# Patient Record
Sex: MALE | Race: WHITE | NOT HISPANIC OR LATINO | Employment: FULL TIME | URBAN - METROPOLITAN AREA
[De-identification: names, ages, dates, MRNs, and addresses within clinical notes are randomized per-mention and may not be internally consistent; named-entity substitution may affect disease eponyms.]

---

## 2020-06-04 ENCOUNTER — HOSPITAL ENCOUNTER (EMERGENCY)
Facility: HOSPITAL | Age: 20
Discharge: HOME | End: 2020-06-04
Attending: EMERGENCY MEDICINE | Admitting: EMERGENCY MEDICINE

## 2020-06-04 VITALS
WEIGHT: 210 LBS | RESPIRATION RATE: 16 BRPM | BODY MASS INDEX: 28.44 KG/M2 | DIASTOLIC BLOOD PRESSURE: 88 MMHG | HEART RATE: 92 BPM | OXYGEN SATURATION: 98 % | TEMPERATURE: 98.5 F | SYSTOLIC BLOOD PRESSURE: 154 MMHG | HEIGHT: 72 IN

## 2020-06-04 DIAGNOSIS — Z87.828 HISTORY OF TRAUMATIC HEAD INJURY: ICD-10-CM

## 2020-06-04 DIAGNOSIS — Z86.69 HISTORY OF SEIZURE DISORDER: ICD-10-CM

## 2020-06-04 DIAGNOSIS — R56.9 SEIZURE (HCC): Primary | ICD-10-CM

## 2020-06-04 LAB
ALBUMIN SERPL-MCNC: 5 G/DL (ref 3.5–5.2)
ALBUMIN/GLOB SERPL: 1.9 G/DL
ALP SERPL-CCNC: 115 U/L (ref 39–117)
ALT SERPL W P-5'-P-CCNC: 22 U/L (ref 1–41)
AMPHET+METHAMPHET UR QL: NEGATIVE
AMPHETAMINES UR QL: NEGATIVE
ANION GAP SERPL CALCULATED.3IONS-SCNC: 16 MMOL/L (ref 5–15)
AST SERPL-CCNC: 27 U/L (ref 1–40)
BACTERIA UR QL AUTO: NORMAL /HPF
BARBITURATES UR QL SCN: NEGATIVE
BASOPHILS # BLD AUTO: 0.02 10*3/MM3 (ref 0–0.2)
BASOPHILS NFR BLD AUTO: 0.3 % (ref 0–1.5)
BENZODIAZ UR QL SCN: NEGATIVE
BILIRUB SERPL-MCNC: 0.2 MG/DL (ref 0.2–1.2)
BILIRUB UR QL STRIP: NEGATIVE
BUN BLD-MCNC: 11 MG/DL (ref 6–20)
BUN/CREAT SERPL: 11.6 (ref 7–25)
BUPRENORPHINE SERPL-MCNC: NEGATIVE NG/ML
CALCIUM SPEC-SCNC: 9.5 MG/DL (ref 8.6–10.5)
CANNABINOIDS SERPL QL: NEGATIVE
CHLORIDE SERPL-SCNC: 96 MMOL/L (ref 98–107)
CLARITY UR: CLEAR
CO2 SERPL-SCNC: 22 MMOL/L (ref 22–29)
COCAINE UR QL: NEGATIVE
COLOR UR: YELLOW
CREAT BLD-MCNC: 0.95 MG/DL (ref 0.76–1.27)
DEPRECATED RDW RBC AUTO: 36.8 FL (ref 37–54)
EOSINOPHIL # BLD AUTO: 0.05 10*3/MM3 (ref 0–0.4)
EOSINOPHIL NFR BLD AUTO: 0.8 % (ref 0.3–6.2)
ERYTHROCYTE [DISTWIDTH] IN BLOOD BY AUTOMATED COUNT: 11.4 % (ref 12.3–15.4)
GFR SERPL CREATININE-BSD FRML MDRD: 99 ML/MIN/1.73
GLOBULIN UR ELPH-MCNC: 2.6 GM/DL
GLUCOSE BLD-MCNC: 89 MG/DL (ref 65–99)
GLUCOSE UR STRIP-MCNC: NEGATIVE MG/DL
HCT VFR BLD AUTO: 41.9 % (ref 37.5–51)
HGB BLD-MCNC: 14.6 G/DL (ref 13–17.7)
HGB UR QL STRIP.AUTO: NEGATIVE
HOLD SPECIMEN: NORMAL
HOLD SPECIMEN: NORMAL
HYALINE CASTS UR QL AUTO: NORMAL /LPF
IMM GRANULOCYTES # BLD AUTO: 0.07 10*3/MM3 (ref 0–0.05)
IMM GRANULOCYTES NFR BLD AUTO: 1.2 % (ref 0–0.5)
KETONES UR QL STRIP: NEGATIVE
LEUKOCYTE ESTERASE UR QL STRIP.AUTO: NEGATIVE
LYMPHOCYTES # BLD AUTO: 1.47 10*3/MM3 (ref 0.7–3.1)
LYMPHOCYTES NFR BLD AUTO: 24.5 % (ref 19.6–45.3)
MAGNESIUM SERPL-MCNC: 2.4 MG/DL (ref 1.6–2.6)
MCH RBC QN AUTO: 30.7 PG (ref 26.6–33)
MCHC RBC AUTO-ENTMCNC: 34.8 G/DL (ref 31.5–35.7)
MCV RBC AUTO: 88.2 FL (ref 79–97)
METHADONE UR QL SCN: NEGATIVE
MONOCYTES # BLD AUTO: 0.45 10*3/MM3 (ref 0.1–0.9)
MONOCYTES NFR BLD AUTO: 7.5 % (ref 5–12)
NEUTROPHILS # BLD AUTO: 3.93 10*3/MM3 (ref 1.7–7)
NEUTROPHILS NFR BLD AUTO: 65.7 % (ref 42.7–76)
NITRITE UR QL STRIP: NEGATIVE
NRBC BLD AUTO-RTO: 0 /100 WBC (ref 0–0.2)
OPIATES UR QL: NEGATIVE
OXYCODONE UR QL SCN: NEGATIVE
PCP UR QL SCN: NEGATIVE
PH UR STRIP.AUTO: 6 [PH] (ref 5–8)
PLATELET # BLD AUTO: 298 10*3/MM3 (ref 140–450)
PMV BLD AUTO: 10.2 FL (ref 6–12)
POTASSIUM BLD-SCNC: 4.3 MMOL/L (ref 3.5–5.2)
PROPOXYPH UR QL: NEGATIVE
PROT SERPL-MCNC: 7.6 G/DL (ref 6–8.5)
PROT UR QL STRIP: ABNORMAL
RBC # BLD AUTO: 4.75 10*6/MM3 (ref 4.14–5.8)
RBC # UR: NORMAL /HPF
REF LAB TEST METHOD: NORMAL
SODIUM BLD-SCNC: 134 MMOL/L (ref 136–145)
SP GR UR STRIP: 1.02 (ref 1–1.03)
SQUAMOUS #/AREA URNS HPF: NORMAL /HPF
TRICYCLICS UR QL SCN: NEGATIVE
UROBILINOGEN UR QL STRIP: ABNORMAL
WBC NRBC COR # BLD: 5.99 10*3/MM3 (ref 3.4–10.8)
WBC UR QL AUTO: NORMAL /HPF
WHOLE BLOOD HOLD SPECIMEN: NORMAL
WHOLE BLOOD HOLD SPECIMEN: NORMAL

## 2020-06-04 PROCEDURE — 80183 DRUG SCRN QUANT OXCARBAZEPIN: CPT | Performed by: PHYSICIAN ASSISTANT

## 2020-06-04 PROCEDURE — 80306 DRUG TEST PRSMV INSTRMNT: CPT | Performed by: PHYSICIAN ASSISTANT

## 2020-06-04 PROCEDURE — P9612 CATHETERIZE FOR URINE SPEC: HCPCS

## 2020-06-04 PROCEDURE — 80053 COMPREHEN METABOLIC PANEL: CPT | Performed by: PHYSICIAN ASSISTANT

## 2020-06-04 PROCEDURE — 83735 ASSAY OF MAGNESIUM: CPT | Performed by: PHYSICIAN ASSISTANT

## 2020-06-04 PROCEDURE — 85025 COMPLETE CBC W/AUTO DIFF WBC: CPT | Performed by: PHYSICIAN ASSISTANT

## 2020-06-04 PROCEDURE — 25010000002 LORAZEPAM PER 2 MG: Performed by: EMERGENCY MEDICINE

## 2020-06-04 PROCEDURE — 96374 THER/PROPH/DIAG INJ IV PUSH: CPT

## 2020-06-04 PROCEDURE — 99284 EMERGENCY DEPT VISIT MOD MDM: CPT

## 2020-06-04 PROCEDURE — 81001 URINALYSIS AUTO W/SCOPE: CPT | Performed by: PHYSICIAN ASSISTANT

## 2020-06-04 RX ORDER — LORAZEPAM 0.5 MG/1
0.5 TABLET ORAL 2 TIMES DAILY
Qty: 6 TABLET | Refills: 0 | Status: SHIPPED | OUTPATIENT
Start: 2020-06-04

## 2020-06-04 RX ORDER — SODIUM CHLORIDE 0.9 % (FLUSH) 0.9 %
10 SYRINGE (ML) INJECTION AS NEEDED
Status: DISCONTINUED | OUTPATIENT
Start: 2020-06-04 | End: 2020-06-05 | Stop reason: HOSPADM

## 2020-06-04 RX ORDER — LORAZEPAM 2 MG/ML
0.5 INJECTION INTRAMUSCULAR ONCE
Status: COMPLETED | OUTPATIENT
Start: 2020-06-04 | End: 2020-06-04

## 2020-06-04 RX ADMIN — LORAZEPAM 0.5 MG: 2 INJECTION INTRAMUSCULAR; INTRAVENOUS at 23:04

## 2020-06-04 RX ADMIN — SODIUM CHLORIDE 1000 ML: 9 INJECTION, SOLUTION INTRAVENOUS at 21:09

## 2020-06-05 NOTE — DISCHARGE INSTRUCTIONS
ER evaluation revealed normal labs and urinalysis.  Continue with Trileptal as directed.  After discussion with patient's routine neurologist, Dr. Schmitt, we will prescribe Ativan 0.5 mg by mouth twice daily x3 days.  Recommend patient to call Dr. Schmitt's office first thing in the morning because Dr. Schmitt recommends epilepsy testing in the close future.  Increase fluids.  Try to get as much sleep as possible.  Return to the ER if any worsening symptoms.

## 2020-06-05 NOTE — ED PROVIDER NOTES
"Subjective   Mr. Saturnino Henderson is a 20 y.o male presenting to the emergency department with complaints of seizure just prior to arrival. He has a history of seizures for which he has taken Keppra but this was discontinued secondary to anger problems arising. He is currently taking Trileptal 900 mg in the morning and 1200 mg at night and he confirms medication compliance. He had a telemedicine visit on 03/27/2020 with Dr. Joseph Schmitt at [facility] and he did not recommend adding a new anti-seizure medication at that time . After a year-long break, he had a \"small, 5-minute seizure\" 2 weeks ago. Approximately 1 hour ago today, he had an unwitnessed seizure lasting 30 minutes and involving generalized shaking. He confirms he bit into the bilateral sides of his tongue and he complains of a headache and fatigue. He confirm recent decrease in sleep. He also had 2 alcoholic drinks last pm. He denies cough, congestion, fever, incontinence, and a history of diabetes, hypertension, and drug use.  There are no other acute symptoms at this time.  Once patient's father arrives, he clarifies that actual seizure activity did not last for 30 minutes.  Patient's seizure activity, as well as post ictal phase lasted approximately 30 minutes combined.  Father also reports patient has history of traumatic head injury as a child.      History provided by:  Patient  Seizures   Seizure activity on arrival: no    Preceding symptoms: headache    Episode characteristics: generalized shaking    Episode characteristics: no incontinence    Return to baseline: yes    Severity:  Severe  Duration:  30 minutes  Progression:  Resolved  Context: decreased sleep    Recent head injury:  No recent head injuries  Meds prior to arrival: Trileptal.  History of seizures: yes    Similar to previous episodes: this episode was longer.    Date of most recent prior episode:  2 weeks ago  Severity:  Severe  Seizure control level:  Poorly " controlled  Current therapy:  Oxcarbazepine  Compliance with current therapy:  Good      Review of Systems   Constitutional: Positive for fatigue. Negative for fever.   HENT: Negative for congestion.    Respiratory: Negative for cough.    Gastrointestinal:        No incontinence   Genitourinary:        No incontinence   Neurological: Positive for seizures and headaches.   Psychiatric/Behavioral: Positive for sleep disturbance.   All other systems reviewed and are negative.      Past Medical History:   Diagnosis Date   • Seizures (CMS/HCC)        No Known Allergies    History reviewed. No pertinent surgical history.    History reviewed. No pertinent family history.    Social History     Socioeconomic History   • Marital status: Single     Spouse name: Not on file   • Number of children: Not on file   • Years of education: Not on file   • Highest education level: Not on file   Tobacco Use   • Smoking status: Never Smoker   • Smokeless tobacco: Never Used   Substance and Sexual Activity   • Alcohol use: Yes     Frequency: Never     Comment: every other week   • Drug use: Never         Objective   Physical Exam   Constitutional: He is oriented to person, place, and time. He appears well-developed and well-nourished. No distress. He is not intubated.   Patient did not appear to be in postictal state.   HENT:   Head: Normocephalic.   Superficial bites to bilateral tongue   Eyes: Conjunctivae are normal. No scleral icterus.   Neck: Normal range of motion.   Cardiovascular: Regular rhythm. Tachycardia present. Exam reveals no gallop and no friction rub.   No murmur heard.  Mild tachycardia   Pulmonary/Chest: Effort normal and breath sounds normal. No accessory muscle usage or stridor. No tachypnea. He is not intubated. No respiratory distress. He has no wheezes. He has no rhonchi. He has no rales.   Abdominal: Soft. There is no tenderness. There is no rebound and no guarding.   Musculoskeletal: Normal range of motion. He  exhibits no edema, tenderness or deformity.   Neurological: He is alert and oriented to person, place, and time. He has normal strength. No cranial nerve deficit or sensory deficit.   Patient does not appear postictal during my examination.   Skin: Skin is warm and dry. He is not diaphoretic.   Psychiatric: He has a normal mood and affect. His behavior is normal.   Nursing note and vitals reviewed.      Procedures         ED Course  Recent Results (from the past 24 hour(s))   Light Blue Top    Collection Time: 06/04/20  7:01 PM   Result Value Ref Range    Extra Tube hold for add-on    Green Top (Gel)    Collection Time: 06/04/20  7:01 PM   Result Value Ref Range    Extra Tube Hold for add-ons.    Lavender Top    Collection Time: 06/04/20  7:01 PM   Result Value Ref Range    Extra Tube hold for add-on    Gold Top - SST    Collection Time: 06/04/20  7:01 PM   Result Value Ref Range    Extra Tube Hold for add-ons.    Comprehensive Metabolic Panel    Collection Time: 06/04/20  7:01 PM   Result Value Ref Range    Glucose 89 65 - 99 mg/dL    BUN 11 6 - 20 mg/dL    Creatinine 0.95 0.76 - 1.27 mg/dL    Sodium 134 (L) 136 - 145 mmol/L    Potassium 4.3 3.5 - 5.2 mmol/L    Chloride 96 (L) 98 - 107 mmol/L    CO2 22.0 22.0 - 29.0 mmol/L    Calcium 9.5 8.6 - 10.5 mg/dL    Total Protein 7.6 6.0 - 8.5 g/dL    Albumin 5.00 3.50 - 5.20 g/dL    ALT (SGPT) 22 1 - 41 U/L    AST (SGOT) 27 1 - 40 U/L    Alkaline Phosphatase 115 39 - 117 U/L    Total Bilirubin 0.2 0.2 - 1.2 mg/dL    eGFR Non African Amer 99 >60 mL/min/1.73    Globulin 2.6 gm/dL    A/G Ratio 1.9 g/dL    BUN/Creatinine Ratio 11.6 7.0 - 25.0    Anion Gap 16.0 (H) 5.0 - 15.0 mmol/L   Magnesium    Collection Time: 06/04/20  7:01 PM   Result Value Ref Range    Magnesium 2.4 1.6 - 2.6 mg/dL   CBC Auto Differential    Collection Time: 06/04/20  7:01 PM   Result Value Ref Range    WBC 5.99 3.40 - 10.80 10*3/mm3    RBC 4.75 4.14 - 5.80 10*6/mm3    Hemoglobin 14.6 13.0 - 17.7 g/dL     Hematocrit 41.9 37.5 - 51.0 %    MCV 88.2 79.0 - 97.0 fL    MCH 30.7 26.6 - 33.0 pg    MCHC 34.8 31.5 - 35.7 g/dL    RDW 11.4 (L) 12.3 - 15.4 %    RDW-SD 36.8 (L) 37.0 - 54.0 fl    MPV 10.2 6.0 - 12.0 fL    Platelets 298 140 - 450 10*3/mm3    Neutrophil % 65.7 42.7 - 76.0 %    Lymphocyte % 24.5 19.6 - 45.3 %    Monocyte % 7.5 5.0 - 12.0 %    Eosinophil % 0.8 0.3 - 6.2 %    Basophil % 0.3 0.0 - 1.5 %    Immature Grans % 1.2 (H) 0.0 - 0.5 %    Neutrophils, Absolute 3.93 1.70 - 7.00 10*3/mm3    Lymphocytes, Absolute 1.47 0.70 - 3.10 10*3/mm3    Monocytes, Absolute 0.45 0.10 - 0.90 10*3/mm3    Eosinophils, Absolute 0.05 0.00 - 0.40 10*3/mm3    Basophils, Absolute 0.02 0.00 - 0.20 10*3/mm3    Immature Grans, Absolute 0.07 (H) 0.00 - 0.05 10*3/mm3    nRBC 0.0 0.0 - 0.2 /100 WBC   Urinalysis With Microscopic If Indicated (No Culture) - Urine, Catheter    Collection Time: 06/04/20  9:03 PM   Result Value Ref Range    Color, UA Yellow Yellow, Straw    Appearance, UA Clear Clear    pH, UA 6.0 5.0 - 8.0    Specific Gravity, UA 1.017 1.001 - 1.030    Glucose, UA Negative Negative    Ketones, UA Negative Negative    Bilirubin, UA Negative Negative    Blood, UA Negative Negative    Protein, UA 30 mg/dL (1+) (A) Negative    Leuk Esterase, UA Negative Negative    Nitrite, UA Negative Negative    Urobilinogen, UA 0.2 E.U./dL 0.2 - 1.0 E.U./dL   Urine Drug Screen - Urine, Clean Catch    Collection Time: 06/04/20  9:03 PM   Result Value Ref Range    THC, Screen, Urine Negative Negative    Phencyclidine (PCP), Urine Negative Negative    Cocaine Screen, Urine Negative Negative    Methamphetamine, Ur Negative Negative    Opiate Screen Negative Negative    Amphetamine Screen, Urine Negative Negative    Benzodiazepine Screen, Urine Negative Negative    Tricyclic Antidepressants Screen Negative Negative    Methadone Screen, Urine Negative Negative    Barbiturates Screen, Urine Negative Negative    Oxycodone Screen, Urine Negative Negative     Propoxyphene Screen Negative Negative    Buprenorphine, Screen, Urine Negative Negative   Urinalysis, Microscopic Only - Urine, Catheter    Collection Time: 06/04/20  9:03 PM   Result Value Ref Range    RBC, UA 0-2 None Seen, 0-2 /HPF    WBC, UA 0-2 None Seen, 0-2 /HPF    Bacteria, UA None Seen None Seen, Trace /HPF    Squamous Epithelial Cells, UA 0-2 None Seen, 0-2 /HPF    Hyaline Casts, UA None Seen 0 - 6 /LPF    Methodology Automated Microscopy      Note: In addition to lab results from this visit, the labs listed above may include labs taken at another facility or during a different encounter within the last 24 hours. Please correlate lab times with ED admission and discharge times for further clarification of the services performed during this visit.    No orders to display     Vitals:    06/04/20 2000 06/04/20 2015 06/04/20 2030 06/04/20 2045   BP: 131/87 150/81 162/88 160/93   Pulse: 78 75 75 76   Resp:       Temp:       TempSrc:       SpO2: 99% 99% 98% 98%   Weight:       Height:         Medications   sodium chloride 0.9 % flush 10 mL (has no administration in time range)   LORazepam (ATIVAN) injection 0.5 mg (has no administration in time range)   sodium chloride 0.9 % bolus 1,000 mL (1,000 mL Intravenous New Bag 6/4/20 2109)     ECG/EMG Results (last 24 hours)     ** No results found for the last 24 hours. **        No orders to display               MDM    Final diagnoses:   Seizure (CMS/[facility])   History of seizure disorder   History of traumatic head injury       Documentation assistance provided by brittanie Gaspar.  Information recorded by the brittanie was done at my direction and has been verified and validated by me.     Sierra Gaspar  06/04/20 2053       Tess Antunez PA-C  06/04/20 9119       Tess Antunez PA-C  06/04/20 0920

## 2020-06-09 LAB — OXCARBAZEPINE: 24 UG/ML (ref 10–35)

## 2021-09-27 PROCEDURE — U0004 COV-19 TEST NON-CDC HGH THRU: HCPCS | Performed by: FAMILY MEDICINE

## 2021-11-17 ENCOUNTER — APPOINTMENT (OUTPATIENT)
Dept: GENERAL RADIOLOGY | Facility: HOSPITAL | Age: 21
End: 2021-11-17

## 2021-11-17 ENCOUNTER — HOSPITAL ENCOUNTER (INPATIENT)
Facility: HOSPITAL | Age: 21
LOS: 2 days | Discharge: HOME | End: 2021-11-19
Attending: EMERGENCY MEDICINE | Admitting: INTERNAL MEDICINE

## 2021-11-17 ENCOUNTER — APPOINTMENT (OUTPATIENT)
Dept: CT IMAGING | Facility: HOSPITAL | Age: 21
End: 2021-11-17

## 2021-11-17 ENCOUNTER — APPOINTMENT (OUTPATIENT)
Dept: NEUROLOGY | Facility: HOSPITAL | Age: 21
End: 2021-11-17

## 2021-11-17 ENCOUNTER — APPOINTMENT (OUTPATIENT)
Dept: MRI IMAGING | Facility: HOSPITAL | Age: 21
End: 2021-11-17

## 2021-11-17 DIAGNOSIS — G40.901 STATUS EPILEPTICUS (HCC): ICD-10-CM

## 2021-11-17 DIAGNOSIS — R56.9 SEIZURE (HCC): Primary | ICD-10-CM

## 2021-11-17 DIAGNOSIS — J96.01 ACUTE RESPIRATORY FAILURE WITH HYPOXIA (HCC): ICD-10-CM

## 2021-11-17 DIAGNOSIS — E87.1 HYPONATREMIA: ICD-10-CM

## 2021-11-17 DIAGNOSIS — D72.829 LEUKOCYTOSIS, UNSPECIFIED TYPE: ICD-10-CM

## 2021-11-17 DIAGNOSIS — R13.10 DYSPHAGIA, UNSPECIFIED TYPE: ICD-10-CM

## 2021-11-17 PROBLEM — I10 ESSENTIAL HYPERTENSION: Status: ACTIVE | Noted: 2021-09-07

## 2021-11-17 LAB
ALBUMIN SERPL-MCNC: 5 G/DL (ref 3.5–5.2)
ALBUMIN/GLOB SERPL: 2.1 G/DL
ALP SERPL-CCNC: 143 U/L (ref 39–117)
ALT SERPL W P-5'-P-CCNC: 13 U/L (ref 1–41)
AMPHET+METHAMPHET UR QL: NEGATIVE
AMPHETAMINES UR QL: NEGATIVE
ANION GAP SERPL CALCULATED.3IONS-SCNC: 22 MMOL/L (ref 5–15)
ARTERIAL PATENCY WRIST A: ABNORMAL
ARTERIAL PATENCY WRIST A: ABNORMAL
AST SERPL-CCNC: 14 U/L (ref 1–40)
ATMOSPHERIC PRESS: ABNORMAL MM[HG]
ATMOSPHERIC PRESS: ABNORMAL MM[HG]
BARBITURATES UR QL SCN: NEGATIVE
BASE EXCESS BLDA CALC-SCNC: -6.7 MMOL/L (ref 0–2)
BASE EXCESS BLDA CALC-SCNC: -8.8 MMOL/L (ref 0–2)
BASOPHILS # BLD AUTO: 0.02 10*3/MM3 (ref 0–0.2)
BASOPHILS NFR BLD AUTO: 0.1 % (ref 0–1.5)
BDY SITE: ABNORMAL
BDY SITE: ABNORMAL
BENZODIAZ UR QL SCN: NEGATIVE
BILIRUB SERPL-MCNC: 0.2 MG/DL (ref 0–1.2)
BODY TEMPERATURE: 37 C
BODY TEMPERATURE: 37 C
BUN SERPL-MCNC: 13 MG/DL (ref 6–20)
BUN/CREAT SERPL: 9.4 (ref 7–25)
BUPRENORPHINE SERPL-MCNC: NEGATIVE NG/ML
CALCIUM SPEC-SCNC: 9.2 MG/DL (ref 8.6–10.5)
CANNABINOIDS SERPL QL: NEGATIVE
CHLORIDE SERPL-SCNC: 92 MMOL/L (ref 98–107)
CO2 BLDA-SCNC: 16.9 MMOL/L (ref 22–33)
CO2 BLDA-SCNC: 17.6 MMOL/L (ref 22–33)
CO2 SERPL-SCNC: 13 MMOL/L (ref 22–29)
COCAINE UR QL: NEGATIVE
COHGB MFR BLD: 0.3 % (ref 0–2)
COHGB MFR BLD: 0.4 % (ref 0–2)
CREAT SERPL-MCNC: 1.39 MG/DL (ref 0.76–1.27)
D-LACTATE SERPL-SCNC: 0.6 MMOL/L (ref 0.5–2)
DEPRECATED RDW RBC AUTO: 37.5 FL (ref 37–54)
EOSINOPHIL # BLD AUTO: 0.02 10*3/MM3 (ref 0–0.4)
EOSINOPHIL NFR BLD AUTO: 0.1 % (ref 0.3–6.2)
EPAP: 0
ERYTHROCYTE [DISTWIDTH] IN BLOOD BY AUTOMATED COUNT: 11.9 % (ref 12.3–15.4)
FLUAV SUBTYP SPEC NAA+PROBE: NOT DETECTED
FLUBV RNA ISLT QL NAA+PROBE: NOT DETECTED
GFR SERPL CREATININE-BSD FRML MDRD: 63 ML/MIN/1.73
GLOBULIN UR ELPH-MCNC: 2.4 GM/DL
GLUCOSE BLDC GLUCOMTR-MCNC: 106 MG/DL (ref 70–130)
GLUCOSE SERPL-MCNC: 236 MG/DL (ref 65–99)
HCO3 BLDA-SCNC: 15.9 MMOL/L (ref 20–26)
HCO3 BLDA-SCNC: 16.8 MMOL/L (ref 20–26)
HCT VFR BLD AUTO: 40.5 % (ref 37.5–51)
HCT VFR BLD CALC: 43.3 % (ref 38–51)
HCT VFR BLD CALC: 44.7 % (ref 38–51)
HGB BLD-MCNC: 14.1 G/DL (ref 13–17.7)
HGB BLDA-MCNC: 14.1 G/DL (ref 13.5–17.5)
HGB BLDA-MCNC: 14.6 G/DL (ref 13.5–17.5)
HOLD SPECIMEN: NORMAL
IMM GRANULOCYTES # BLD AUTO: 0.24 10*3/MM3 (ref 0–0.05)
IMM GRANULOCYTES NFR BLD AUTO: 1.7 % (ref 0–0.5)
INHALED O2 CONCENTRATION: 35 %
INHALED O2 CONCENTRATION: 50 %
IPAP: 0
LYMPHOCYTES # BLD AUTO: 2.55 10*3/MM3 (ref 0.7–3.1)
LYMPHOCYTES NFR BLD AUTO: 18 % (ref 19.6–45.3)
MCH RBC QN AUTO: 30.1 PG (ref 26.6–33)
MCHC RBC AUTO-ENTMCNC: 34.8 G/DL (ref 31.5–35.7)
MCV RBC AUTO: 86.5 FL (ref 79–97)
METHADONE UR QL SCN: NEGATIVE
METHGB BLD QL: 0.7 % (ref 0–1.5)
METHGB BLD QL: 0.8 % (ref 0–1.5)
MODALITY: ABNORMAL
MODALITY: ABNORMAL
MONOCYTES # BLD AUTO: 0.61 10*3/MM3 (ref 0.1–0.9)
MONOCYTES NFR BLD AUTO: 4.3 % (ref 5–12)
NEUTROPHILS NFR BLD AUTO: 10.74 10*3/MM3 (ref 1.7–7)
NEUTROPHILS NFR BLD AUTO: 75.8 % (ref 42.7–76)
NOTE: ABNORMAL
NOTE: ABNORMAL
NRBC BLD AUTO-RTO: 0 /100 WBC (ref 0–0.2)
NT-PROBNP SERPL-MCNC: 77.9 PG/ML (ref 0–450)
OPIATES UR QL: NEGATIVE
OXYCODONE UR QL SCN: NEGATIVE
OXYHGB MFR BLDV: 97.9 % (ref 94–99)
OXYHGB MFR BLDV: 97.9 % (ref 94–99)
PAW @ PEAK INSP FLOW SETTING VENT: 0 CMH2O
PCO2 BLDA: 27.8 MM HG (ref 35–45)
PCO2 BLDA: 30.8 MM HG (ref 35–45)
PCO2 TEMP ADJ BLD: 27.8 MM HG (ref 35–48)
PCO2 TEMP ADJ BLD: 30.8 MM HG (ref 35–48)
PCP UR QL SCN: NEGATIVE
PH BLDA: 7.32 PH UNITS (ref 7.35–7.45)
PH BLDA: 7.39 PH UNITS (ref 7.35–7.45)
PH, TEMP CORRECTED: 7.32 PH UNITS
PH, TEMP CORRECTED: 7.39 PH UNITS
PLATELET # BLD AUTO: 461 10*3/MM3 (ref 140–450)
PMV BLD AUTO: 9.7 FL (ref 6–12)
PO2 BLDA: 122 MM HG (ref 83–108)
PO2 BLDA: 139 MM HG (ref 83–108)
PO2 TEMP ADJ BLD: 122 MM HG (ref 83–108)
PO2 TEMP ADJ BLD: 139 MM HG (ref 83–108)
POTASSIUM SERPL-SCNC: 3.9 MMOL/L (ref 3.5–5.2)
PROCALCITONIN SERPL-MCNC: 2.72 NG/ML (ref 0–0.25)
PROPOXYPH UR QL: NEGATIVE
PROT SERPL-MCNC: 7.4 G/DL (ref 6–8.5)
QT INTERVAL: 318 MS
QTC INTERVAL: 453 MS
RBC # BLD AUTO: 4.68 10*6/MM3 (ref 4.14–5.8)
SARS-COV-2 RNA PNL SPEC NAA+PROBE: NOT DETECTED
SODIUM SERPL-SCNC: 127 MMOL/L (ref 136–145)
TOTAL RATE: 0 BREATHS/MINUTE
TRICYCLICS UR QL SCN: NEGATIVE
TROPONIN T SERPL-MCNC: <0.01 NG/ML (ref 0–0.03)
VENTILATOR MODE: ABNORMAL
WBC # BLD AUTO: 14.18 10*3/MM3 (ref 3.4–10.8)
WHOLE BLOOD HOLD SPECIMEN: NORMAL
WHOLE BLOOD HOLD SPECIMEN: NORMAL

## 2021-11-17 PROCEDURE — 5A1945Z RESPIRATORY VENTILATION, 24-96 CONSECUTIVE HOURS: ICD-10-PCS | Performed by: INTERNAL MEDICINE

## 2021-11-17 PROCEDURE — 25010000002 VANCOMYCIN 10 G RECONSTITUTED SOLUTION

## 2021-11-17 PROCEDURE — 93005 ELECTROCARDIOGRAM TRACING: CPT | Performed by: EMERGENCY MEDICINE

## 2021-11-17 PROCEDURE — 94799 UNLISTED PULMONARY SVC/PX: CPT

## 2021-11-17 PROCEDURE — 87186 SC STD MICRODIL/AGAR DIL: CPT | Performed by: NURSE PRACTITIONER

## 2021-11-17 PROCEDURE — 99223 1ST HOSP IP/OBS HIGH 75: CPT | Performed by: PSYCHIATRY & NEUROLOGY

## 2021-11-17 PROCEDURE — 70450 CT HEAD/BRAIN W/O DYE: CPT

## 2021-11-17 PROCEDURE — 0 LEVETIRACETAM IN NACL 0.75% 1000 MG/100ML SOLUTION: Performed by: EMERGENCY MEDICINE

## 2021-11-17 PROCEDURE — 87205 SMEAR GRAM STAIN: CPT | Performed by: NURSE PRACTITIONER

## 2021-11-17 PROCEDURE — 25010000002 PROPOFOL 10 MG/ML EMULSION

## 2021-11-17 PROCEDURE — 25010000002 FENTANYL 10 MCG/1 ML NS: Performed by: NURSE PRACTITIONER

## 2021-11-17 PROCEDURE — 25010000002 FENTANYL 10 MCG/1 ML NS: Performed by: INTERNAL MEDICINE

## 2021-11-17 PROCEDURE — 84484 ASSAY OF TROPONIN QUANT: CPT | Performed by: EMERGENCY MEDICINE

## 2021-11-17 PROCEDURE — 82375 ASSAY CARBOXYHB QUANT: CPT

## 2021-11-17 PROCEDURE — 25010000002 PROPOFOL 10 MG/ML EMULSION: Performed by: NURSE PRACTITIONER

## 2021-11-17 PROCEDURE — 95819 EEG AWAKE AND ASLEEP: CPT

## 2021-11-17 PROCEDURE — 84145 PROCALCITONIN (PCT): CPT | Performed by: INTERNAL MEDICINE

## 2021-11-17 PROCEDURE — 25010000002 CEFTRIAXONE PER 250 MG: Performed by: INTERNAL MEDICINE

## 2021-11-17 PROCEDURE — 87636 SARSCOV2 & INF A&B AMP PRB: CPT | Performed by: NURSE PRACTITIONER

## 2021-11-17 PROCEDURE — 99291 CRITICAL CARE FIRST HOUR: CPT | Performed by: INTERNAL MEDICINE

## 2021-11-17 PROCEDURE — 87040 BLOOD CULTURE FOR BACTERIA: CPT | Performed by: INTERNAL MEDICINE

## 2021-11-17 PROCEDURE — 25010000002 DEXAMETHASONE PER 1 MG: Performed by: INTERNAL MEDICINE

## 2021-11-17 PROCEDURE — 83880 ASSAY OF NATRIURETIC PEPTIDE: CPT | Performed by: EMERGENCY MEDICINE

## 2021-11-17 PROCEDURE — 87077 CULTURE AEROBIC IDENTIFY: CPT | Performed by: NURSE PRACTITIONER

## 2021-11-17 PROCEDURE — 83050 HGB METHEMOGLOBIN QUAN: CPT

## 2021-11-17 PROCEDURE — 36600 WITHDRAWAL OF ARTERIAL BLOOD: CPT

## 2021-11-17 PROCEDURE — 99291 CRITICAL CARE FIRST HOUR: CPT

## 2021-11-17 PROCEDURE — 87070 CULTURE OTHR SPECIMN AEROBIC: CPT | Performed by: NURSE PRACTITIONER

## 2021-11-17 PROCEDURE — 0BH17EZ INSERTION OF ENDOTRACHEAL AIRWAY INTO TRACHEA, VIA NATURAL OR ARTIFICIAL OPENING: ICD-10-PCS | Performed by: EMERGENCY MEDICINE

## 2021-11-17 PROCEDURE — 70553 MRI BRAIN STEM W/O & W/DYE: CPT

## 2021-11-17 PROCEDURE — 83605 ASSAY OF LACTIC ACID: CPT | Performed by: INTERNAL MEDICINE

## 2021-11-17 PROCEDURE — 82805 BLOOD GASES W/O2 SATURATION: CPT

## 2021-11-17 PROCEDURE — 87147 CULTURE TYPE IMMUNOLOGIC: CPT | Performed by: NURSE PRACTITIONER

## 2021-11-17 PROCEDURE — 85025 COMPLETE CBC W/AUTO DIFF WBC: CPT | Performed by: EMERGENCY MEDICINE

## 2021-11-17 PROCEDURE — 80306 DRUG TEST PRSMV INSTRMNT: CPT | Performed by: EMERGENCY MEDICINE

## 2021-11-17 PROCEDURE — 25010000002 PROPOFOL 10 MG/ML EMULSION: Performed by: STUDENT IN AN ORGANIZED HEALTH CARE EDUCATION/TRAINING PROGRAM

## 2021-11-17 PROCEDURE — 82962 GLUCOSE BLOOD TEST: CPT

## 2021-11-17 PROCEDURE — 80053 COMPREHEN METABOLIC PANEL: CPT | Performed by: EMERGENCY MEDICINE

## 2021-11-17 PROCEDURE — 71045 X-RAY EXAM CHEST 1 VIEW: CPT

## 2021-11-17 PROCEDURE — 31500 INSERT EMERGENCY AIRWAY: CPT

## 2021-11-17 PROCEDURE — 51702 INSERT TEMP BLADDER CATH: CPT

## 2021-11-17 PROCEDURE — 74018 RADEX ABDOMEN 1 VIEW: CPT

## 2021-11-17 PROCEDURE — 25010000002 ENOXAPARIN PER 10 MG: Performed by: NURSE PRACTITIONER

## 2021-11-17 RX ORDER — OXCARBAZEPINE 300 MG/1
1200 TABLET, FILM COATED ORAL NIGHTLY
Status: DISCONTINUED | OUTPATIENT
Start: 2021-11-17 | End: 2021-11-19 | Stop reason: HOSPADM

## 2021-11-17 RX ORDER — FAMOTIDINE 10 MG/ML
20 INJECTION, SOLUTION INTRAVENOUS 2 TIMES DAILY
Status: DISCONTINUED | OUTPATIENT
Start: 2021-11-17 | End: 2021-11-19

## 2021-11-17 RX ORDER — LEVETIRACETAM 10 MG/ML
1000 INJECTION INTRAVASCULAR ONCE
Status: COMPLETED | OUTPATIENT
Start: 2021-11-17 | End: 2021-11-17

## 2021-11-17 RX ORDER — ONDANSETRON 2 MG/ML
4 INJECTION INTRAMUSCULAR; INTRAVENOUS EVERY 6 HOURS PRN
Status: DISCONTINUED | OUTPATIENT
Start: 2021-11-17 | End: 2021-11-19 | Stop reason: HOSPADM

## 2021-11-17 RX ORDER — LORAZEPAM 2 MG/ML
INJECTION INTRAMUSCULAR
Status: ACTIVE
Start: 2021-11-17 | End: 2021-11-17

## 2021-11-17 RX ORDER — CHLORHEXIDINE GLUCONATE 0.12 MG/ML
15 RINSE ORAL EVERY 12 HOURS SCHEDULED
Status: DISCONTINUED | OUTPATIENT
Start: 2021-11-17 | End: 2021-11-18

## 2021-11-17 RX ORDER — TOPIRAMATE 100 MG/1
200 TABLET, FILM COATED ORAL EVERY 12 HOURS SCHEDULED
Status: DISCONTINUED | OUTPATIENT
Start: 2021-11-17 | End: 2021-11-19

## 2021-11-17 RX ORDER — MIDAZOLAM IN NACL,ISO-OSMOT/PF 50 MG/50ML
1-10 INFUSION BOTTLE (ML) INTRAVENOUS
Status: DISCONTINUED | OUTPATIENT
Start: 2021-11-17 | End: 2021-11-18

## 2021-11-17 RX ORDER — OXCARBAZEPINE 300 MG/1
900 TABLET, FILM COATED ORAL DAILY
Status: DISCONTINUED | OUTPATIENT
Start: 2021-11-18 | End: 2021-11-19 | Stop reason: HOSPADM

## 2021-11-17 RX ORDER — MIDAZOLAM IN NACL,ISO-OSMOT/PF 50 MG/50ML
1 INFUSION BOTTLE (ML) INTRAVENOUS
Status: DISCONTINUED | OUTPATIENT
Start: 2021-11-17 | End: 2021-11-17 | Stop reason: SDUPTHER

## 2021-11-17 RX ORDER — SODIUM CHLORIDE 0.9 % (FLUSH) 0.9 %
10 SYRINGE (ML) INJECTION AS NEEDED
Status: DISCONTINUED | OUTPATIENT
Start: 2021-11-17 | End: 2021-11-19 | Stop reason: HOSPADM

## 2021-11-17 RX ORDER — ACETAMINOPHEN 325 MG/1
650 TABLET ORAL EVERY 4 HOURS PRN
Status: DISCONTINUED | OUTPATIENT
Start: 2021-11-17 | End: 2021-11-19 | Stop reason: HOSPADM

## 2021-11-17 RX ORDER — LEVETIRACETAM 5 MG/ML
500 INJECTION INTRAVASCULAR EVERY 12 HOURS SCHEDULED
Status: DISCONTINUED | OUTPATIENT
Start: 2021-11-17 | End: 2021-11-18

## 2021-11-17 RX ORDER — PROPOFOL 10 MG/ML
VIAL (ML) INTRAVENOUS
Status: COMPLETED
Start: 2021-11-17 | End: 2021-11-17

## 2021-11-17 RX ORDER — ALBUTEROL SULFATE 2.5 MG/3ML
2.5 SOLUTION RESPIRATORY (INHALATION) EVERY 6 HOURS PRN
Status: DISCONTINUED | OUTPATIENT
Start: 2021-11-17 | End: 2021-11-19 | Stop reason: HOSPADM

## 2021-11-17 RX ORDER — PROPOFOL 10 MG/ML
70 VIAL (ML) INTRAVENOUS ONCE
Status: COMPLETED | OUTPATIENT
Start: 2021-11-17 | End: 2021-11-17

## 2021-11-17 RX ORDER — PROPOFOL 10 MG/ML
40 VIAL (ML) INTRAVENOUS ONCE
Status: DISCONTINUED | OUTPATIENT
Start: 2021-11-17 | End: 2021-11-17

## 2021-11-17 RX ORDER — SODIUM CHLORIDE 0.9 % (FLUSH) 0.9 %
10 SYRINGE (ML) INJECTION EVERY 12 HOURS SCHEDULED
Status: DISCONTINUED | OUTPATIENT
Start: 2021-11-17 | End: 2021-11-19 | Stop reason: HOSPADM

## 2021-11-17 RX ORDER — DEXMEDETOMIDINE HYDROCHLORIDE 4 UG/ML
.2-1.5 INJECTION, SOLUTION INTRAVENOUS
Status: DISCONTINUED | OUTPATIENT
Start: 2021-11-17 | End: 2021-11-18

## 2021-11-17 RX ORDER — DEXAMETHASONE SODIUM PHOSPHATE 4 MG/ML
4 INJECTION, SOLUTION INTRA-ARTICULAR; INTRALESIONAL; INTRAMUSCULAR; INTRAVENOUS; SOFT TISSUE EVERY 6 HOURS SCHEDULED
Status: DISCONTINUED | OUTPATIENT
Start: 2021-11-17 | End: 2021-11-18

## 2021-11-17 RX ORDER — SODIUM CHLORIDE 9 MG/ML
100 INJECTION, SOLUTION INTRAVENOUS CONTINUOUS
Status: DISCONTINUED | OUTPATIENT
Start: 2021-11-17 | End: 2021-11-19 | Stop reason: HOSPADM

## 2021-11-17 RX ORDER — OXCARBAZEPINE 300 MG/1
1200 TABLET, FILM COATED ORAL ONCE
Status: COMPLETED | OUTPATIENT
Start: 2021-11-17 | End: 2021-11-17

## 2021-11-17 RX ORDER — ACETAMINOPHEN 650 MG/1
650 SUPPOSITORY RECTAL EVERY 4 HOURS PRN
Status: DISCONTINUED | OUTPATIENT
Start: 2021-11-17 | End: 2021-11-19 | Stop reason: HOSPADM

## 2021-11-17 RX ADMIN — PROPOFOL 50 MCG/KG/MIN: 10 INJECTION, EMULSION INTRAVENOUS at 21:43

## 2021-11-17 RX ADMIN — LEVETIRACETAM 1000 MG: 10 INJECTION INTRAVASCULAR at 09:27

## 2021-11-17 RX ADMIN — SODIUM CHLORIDE 250 ML/HR: 9 INJECTION, SOLUTION INTRAVENOUS at 11:06

## 2021-11-17 RX ADMIN — CHLORHEXIDINE GLUCONATE 15 ML: 1.2 SOLUTION ORAL at 22:35

## 2021-11-17 RX ADMIN — SODIUM CHLORIDE 250 ML/HR: 9 INJECTION, SOLUTION INTRAVENOUS at 15:11

## 2021-11-17 RX ADMIN — DEXMEDETOMIDINE HYDROCHLORIDE 0.6 MCG/KG/HR: 4 INJECTION, SOLUTION INTRAVENOUS at 23:06

## 2021-11-17 RX ADMIN — LEVETIRACETAM 1000 MG: 10 INJECTION INTRAVASCULAR at 10:25

## 2021-11-17 RX ADMIN — FAMOTIDINE 20 MG: 10 INJECTION, SOLUTION INTRAVENOUS at 13:23

## 2021-11-17 RX ADMIN — TOPIRAMATE 200 MG: 100 TABLET, FILM COATED ORAL at 17:09

## 2021-11-17 RX ADMIN — OXCARBAZEPINE 1200 MG: 300 TABLET, FILM COATED ORAL at 17:09

## 2021-11-17 RX ADMIN — SODIUM CHLORIDE 2 G: 900 INJECTION INTRAVENOUS at 18:20

## 2021-11-17 RX ADMIN — ACETAMINOPHEN 650 MG: 650 SUPPOSITORY RECTAL at 13:15

## 2021-11-17 RX ADMIN — Medication 1 MG/HR: at 09:36

## 2021-11-17 RX ADMIN — FAMOTIDINE 20 MG: 10 INJECTION, SOLUTION INTRAVENOUS at 22:35

## 2021-11-17 RX ADMIN — PROPOFOL 50 MCG/KG/MIN: 10 INJECTION, EMULSION INTRAVENOUS at 23:06

## 2021-11-17 RX ADMIN — PROPOFOL 70 MG: 10 INJECTION, EMULSION INTRAVENOUS at 21:46

## 2021-11-17 RX ADMIN — ENOXAPARIN SODIUM 40 MG: 40 INJECTION SUBCUTANEOUS at 13:23

## 2021-11-17 RX ADMIN — VANCOMYCIN HYDROCHLORIDE 2000 MG: 10 INJECTION, POWDER, LYOPHILIZED, FOR SOLUTION INTRAVENOUS at 19:32

## 2021-11-17 RX ADMIN — Medication 8 MG/HR: at 11:05

## 2021-11-17 RX ADMIN — DEXAMETHASONE SODIUM PHOSPHATE 4 MG: 4 INJECTION, SOLUTION INTRA-ARTICULAR; INTRALESIONAL; INTRAMUSCULAR; INTRAVENOUS; SOFT TISSUE at 18:15

## 2021-11-17 RX ADMIN — Medication 300 MCG/HR: at 23:06

## 2021-11-17 RX ADMIN — DEXMEDETOMIDINE HYDROCHLORIDE 0.2 MCG/KG/HR: 4 INJECTION, SOLUTION INTRAVENOUS at 16:56

## 2021-11-17 RX ADMIN — SODIUM CHLORIDE, PRESERVATIVE FREE 10 ML: 5 INJECTION INTRAVENOUS at 22:38

## 2021-11-17 RX ADMIN — TOPIRAMATE 200 MG: 100 TABLET, FILM COATED ORAL at 22:38

## 2021-11-17 RX ADMIN — Medication 50 MCG/HR: at 10:20

## 2021-11-17 RX ADMIN — SODIUM CHLORIDE, PRESERVATIVE FREE 10 ML: 5 INJECTION INTRAVENOUS at 13:35

## 2021-11-17 NOTE — CONSULTS
"    Referring Provider: Dr Vigil  Reason for Consultation: seizure    Patient Care Team:  Provider, No Known as PCP - Joseph Lane MD as Consulting Physician (Neurology)    Chief complaint seizure    Subjective .     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  His wife reports no missed doses of meds, no alcohol, no sleep deprivation or illness or other obvious precipitant. First brief seizure at 4am, then at 8:30 prolonged seizure with typical onset, pt made typical sound, looked around room, then flexion RUE, upward extension LUE (fencing posture), but did not becky and called EMS.  Had urinary incontinence.  Attempted intubation in field 2/2 low O2 sats, reintubated in ED with no sedation or paralytic required. As pt roused, noted to have no mvmt of left side and taken for stat HCT. Received ativan 1mg and Keppra 1g (very poorly tolerated in past per father).  No history obtainable from patient.  Some recent stress,  2 weeks ago.  Last seizure was in July while at work and after having a couple bourbons, and April prior to that.  Does not drink a lot per his wife, although some seizures in the past associated with having 1 or 2 alcoholic drinks but no alcohol yesterday.  No reported fevers or headache.  No recent changes in meds.  Excellent compliance per patient's wife.  Gets aura of \"weird feeling\" prior to seizures.  Much of his epilepsy treatment in the past has been through [facility] per his father, more recently at [facility], but has appointment at  epilepsy center with Dr. Shaunna cordova for January 2022.  Patient has been intubated with seizures in the past per his father.  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  Brain MRI without gadolinium at [facility] 2/15 showed slightly smaller left " "hippocampus with normal signal  Routine EEG 6/13 normal, 72-hour inpatient video EEG 2/15 at [facility] showed 1 spell of weird feeling followed by gagging and vomiting without EEG change.  Severe generalized slowing and excessive beta also noted.  EMU June 2020-patient tapered off med and had no spells, EEG normal.    Review of Systems  Review of systems could not be obtained due to   patient sedation status. patient intubated.     History  Past Medical History:   Diagnosis Date   • Seizures ([facility])    • TBI (traumatic brain injury) ([facility])     Age 5   , History reviewed. No pertinent surgical history., History reviewed. No pertinent family history.,   Social History     Socioeconomic History   • Marital status:    Tobacco Use   • Smoking status: Never Smoker   • Smokeless tobacco: Never Used   Substance and Sexual Activity   • Alcohol use: Yes     Comment: every other week   • Drug use: Never     E-cigarette/Vaping     E-cigarette/Vaping Substances     E-cigarette/Vaping Devices       , (Not in a hospital admission)  , Scheduled Meds:  chlorhexidine, 15 mL, Mouth/Throat, Q12H  enoxaparin, 40 mg, Subcutaneous, Q24H  famotidine, 20 mg, Intravenous, BID  levETIRAcetam, 500 mg, Intravenous, Q12H  LORazepam, , ,   sodium chloride, 10 mL, Intravenous, Q12H    , Continuous Infusions:  fentanyl 10 mcg/mL,  mcg/hr, Last Rate: 150 mcg/hr (11/17/21 1205)  midazolam, 1-10 mg/hr, Last Rate: 9 mg/hr (11/17/21 1201)  sodium chloride, 250 mL/hr, Last Rate: 250 mL/hr (11/17/21 1106)    , PRN Meds:  •  acetaminophen **OR** acetaminophen  •  albuterol  •  ondansetron  •  sodium chloride  •  sodium chloride and Allergies:  Bee pollen and Bee venom    Objective     Vital Signs   Blood pressure 119/67, pulse 90, temperature 100.4 °F (38 °C), temperature source Bladder, resp. rate 24, height 182.9 cm (72\"), weight 95.3 kg (210 lb), SpO2 100 %.    Physical Exam:   Well-developed young white man lying intubated and " sedated on the ER gurColona, intermittently mildly agitated as EEG leads being placed.  No clear response to verbal stim, more agitated with tactile stim.  No response to painful stim on the left.  Unable to assess memory and fund of knowledge.  Pupils 4 mm and reactive, no response to visual threat, gaze conjugate, extraocular movements spontaneously intact with roving eye movements.  No obvious facial asymmetry.  No abnormal facial movement.  Unable to assess palate or shoulder elevation.  Spontaneous movements of trunk and right upper and lower extremity observed, some forceful.  No movement elicited left upper or lower extremity  No abnormal movement.  Muscle tone normal; reflexes trace to 1+ and symmetric, no response to plantar stim  Unable to assess coordination  HEENT: NCAT, some dried blood around nostrils;  unable to visualize tongue; ET tube in place.  Neck supple, no carotid bruit appreciated  Heart RRR no murmur appreciated  Lungs clear to auscultation, symmetric chest wall expansion  Abdomen soft, NT, ND with positive bowel sounds   Skin warm and dry, no rashes appreciated  Extremities without cyanosis or edema  Psych: Unable to assess      Results Review:   I reviewed the patient's new clinical results.  I reviewed the patient's new imaging results and agree with the interpretation.  I reviewed the patient's other test results and agree with the interpretation     Head CT images reviewed, agree no acute abnormality    Labs reviewed  ABG with pH 7.32 PCO2 31 PO2 139 bicarb 15.9  UDS negative  COVID-19 not detected  CBC white count 14 H&H 14 and 40 platelets 461  proBNP and troponin normal    Preliminary EEG shows slowing and suppression, more pronounced on the right, no epileptiform discharges.    Assessment/Plan       Status epilepticus (HCC)    Essential hypertension      21-year-old man with intractable epilepsy, with breakthrough prolonged (25 minutes) seizure today associated with desaturation into  the 60s, now intubated.  No obvious seizure threshold lowering factor.  Patient normally takes Trokendi 100 mg and Trileptal 900/1200 with good compliance by report.  Has not tolerated Keppra well in the past.  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  Wife reports postictal period typically lasts a day.  Low suspicion of CNS infection given lack of premonitory symptoms (and history of epilepsy); likelihood of e.g. stroke also very low.  Consider MRI pending course overnight.  Meanwhile, will place NG so patient can receive Trileptal.  Per outside records, level on this dose is high and not likely to benefit from additional OXC.  Will push TPM, avoid third agent unless necessary.    I discussed the patient's findings and my recommendations with family, nursing staff and primary care team    Erma Greer MD  11/17/21  12:06 EST

## 2021-11-17 NOTE — ED PROVIDER NOTES
Subjective   This patient is a 21-year-old gentleman.  No history is available.  EMS brought the patient in with a history of seizure.  Initial EMS report indicated that they attempted to intubate the patient.  Oxygen saturations were in the 60 or 70% range when initial intubation took place.  Evidently, oxygen saturations never came up and there was appropriate concerned that intubation was not successful and the tube was therefore pulled the patient came in not intubated but unresponsive.  Patient evidently received Versed in route.  Just prior to arrival was still seizing.  According to EMS report, patient had been seizing for approximately 1 hour.  Onset of seizure activity was approximately 825 this morning.  Patient has a history of traumatic brain injury and was evidently at the home of his girlfriends when this occurred.  Nobody present here in the emergency department including EMS team is aware of the patient's baseline mental status or medications.  Documentation here indicates that the patient may be on or in the past has taken both Topamax and Trileptal.  In summary, we have a 21-year-old gentleman brought in via EMS with initial call for seizure with unsuccessful intubation, hypoxia, and concern for airway protection who comes in for evaluation.  Patient is unable to provide history.    Past medical history  Seizure disorder on Trileptal and Topamax.  Traumatic brain injury as a child.    Family history  Unable to obtain secondary to patient's critical status and noncommunicative state          Review of Systems   Unable to perform ROS: Acuity of condition   Neurological: Positive for seizures.       Past Medical History:   Diagnosis Date   • Seizures ([facility])    • TBI (traumatic brain injury) ([facility])     Age 5       Allergies   Allergen Reactions   • Bee Pollen Anaphylaxis     Bee stings   • Bee Venom Swelling     Swelling of the face and the sting area      • Keppra [Levetiracetam] Other (See Comments)  and Hallucinations     Father reports extreme hallucinations and aggressiveness with Keppra         History reviewed. No pertinent surgical history.    History reviewed. No pertinent family history.    Social History     Socioeconomic History   • Marital status:    Tobacco Use   • Smoking status: Never Smoker   • Smokeless tobacco: Never Used   Substance and Sexual Activity   • Alcohol use: Yes     Comment: every other week   • Drug use: Never           Objective   Physical Exam  Vitals and nursing note reviewed.   Constitutional:       General: He is in acute distress.      Appearance: He is ill-appearing and diaphoretic. He is not toxic-appearing.   HENT:      Head: Normocephalic.      Right Ear: External ear normal.      Left Ear: External ear normal.      Nose: Nose normal.      Mouth/Throat:      Mouth: Mucous membranes are dry.      Pharynx: Oropharynx is clear.      Comments: Dried blood noted around the tongue and lips.  No signs of direct trauma to the tongue.  No active bleeding within the oropharynx or mouth.  Eyes:      General:         Right eye: No discharge.         Left eye: No discharge.      Conjunctiva/sclera: Conjunctivae normal.   Cardiovascular:      Rate and Rhythm: Regular rhythm. Tachycardia present.      Pulses: Normal pulses.   Pulmonary:      Breath sounds: Wheezing and rhonchi present.      Comments: Patient breathing transiently on his own, positive rhonchi and wheezing.  No crepitance on the chest wall.  Abdominal:      General: Abdomen is flat. There is no distension.      Palpations: Abdomen is soft.   Musculoskeletal:         General: No deformity or signs of injury.      Cervical back: No rigidity.      Right lower leg: No edema.      Left lower leg: No edema.   Lymphadenopathy:      Cervical: No cervical adenopathy.   Skin:     General: Skin is warm.      Capillary Refill: Capillary refill takes 2 to 3 seconds.      Findings: No bruising, lesion or rash.   Neurological:       Comments: Patient unresponsive.  No active seizure activity noted.  No withdrawal to pain.  Pupils 7 mm and reactive minimally to the light bilaterally.  No ability to follow commands.  Breathing transiently on his own.         Intubation    Date/Time: 11/17/2021 10:08 AM  Performed by: Tyler Gerardo MD  Authorized by: Tyler Gerardo MD     Consent:     Consent obtained:  Emergent situation    Consent given by: Patient unresponsive and no family at bedside emergent situation/life-saving intervention.    Alternatives discussed:  Alternative treatment  Pre-procedure details:     Patient status:  Unresponsive    Mallampati score:  II    Pretreatment medications:  None    Paralytics:  None  Procedure details:     Preoxygenation:  Bag valve mask    CPR in progress: no      Intubation method:  Oral    Oral intubation technique:  Direct    Laryngoscope blade:  Mac 4    Tube size (mm):  7.5    Tube type:  Cuffed    Number of attempts:  1    Ventilation between attempts: no      Cricoid pressure: no      Tube visualized through cords: yes    Placement assessment:     ETT to lip:  24    ETT to teeth:  23    Tube secured with:  ETT lucio    Breath sounds:  Equal    Placement verification: chest rise, condensation, CXR verification, direct visualization, equal breath sounds, ETCO2 detector and tube exhalation      CXR findings:  ETT in proper place  Post-procedure details:     Patient tolerance of procedure:  Tolerated well, no immediate complications    Critical Care  Performed by: Tyler Gerardo MD  Authorized by: Tyler Gerardo MD     Critical care provider statement:     Critical care time (minutes):  45    Critical care start time:  11/17/2021 9:27 AM    Critical care end time:  11/17/2021 10:12 AM    Critical care was necessary to treat or prevent imminent or life-threatening deterioration of the following conditions:  CNS failure or compromise    Critical care was time spent personally by me on the  following activities:  Discussions with consultants, evaluation of patient's response to treatment, examination of patient, ventilator management, review of old charts, re-evaluation of patient's condition, pulse oximetry, ordering and review of radiographic studies, ordering and review of laboratory studies and ordering and performing treatments and interventions    I assumed direction of critical care for this patient from another provider in my specialty: no                 ED Course  ED Course as of 11/17/21 1548   Wed Nov 17, 2021   0924 Patient unresponsive at this time but does not appear to be actively seizing.  I was able to quickly review past medical documentation.  It appears that the patient has been managed most recently out of town at [facility].  He has both a primary care physician and a neurologist within that healthcare system.  Patient was emergently intubated here secondary to respiratory compromise and low oxygen saturations and in order to protect the airway.  Patient was easily intubated on first pass with direct visualization of the cords, tube passing through the cords, bilateral breath sounds, and color change capnography.  Post intubation chest x-ray pending.  Oxygen saturations 97% on the ventilator.  No complications.  I have ordered a CT of the head and basic labs.  I have loaded the patient with Keppra.  I have noted in the chart that the patient appears to be on Trileptal.  I do anticipate admission here for further care including potential consultation with neurology.  We will certainly get a hold of any family available to discuss course of care with him.  Critical care time will be noted downstream. [RACHELLE]   5566 Patient's IV has been dislodged and required replacement by nursing staff.  Versed drip has been ordered for sedation/control.  Planning to get the patient over to CT soon.  Patient moving extremities unilaterally only. [RACHELLE]   8534 I have reexamined the  patient twice personally for total 3 evaluations.  Patient is withdrawing now to pain on the right side but not moving the left side with response to painful stimuli.  New IV has been placed and Keppra has been given.  I have paged the intensivist, Dr. Franklin Vigil for admission will discuss sedation agents with him to determine if he would like to move to Precedex or something similar from Versed.  Patient is going emergently over to the CT scanner.  Patient will be admitted to Dr. Vigil for further care. [RACHELLE]   0959 Patient is overbreathing the vent with a respiratory rate of 33. [RACHELLE]   0959   ABG on the vent showed a pH of 7.32.  CMP shows the patient to be hyponatremic at 127 with a creatinine of 1.39.  IV fluid rehydration will start when he gets back.  Troponin and BNP normal.  I reviewed the chest x-ray dependently.  Endotracheal tube appears to be well-placed several centimeters above the robert.  Awaiting official read by Dr. Beatty. [RACHELLE]   1004 Dr. Greer (Neurology) page in consultation.   [RACHELLE]   1005 I discussed the case personally with Dr. Vigil at 10:03am ET.  We discussed the presentation, all labs resulted to date, neurologic findings (Right sided movement only) and past medical history, including notes I have reviewed from [facility] and history of seizure disorder.  Dr. Vigil like to maintain the Versed drip and indicated he may add something for sedation control after evaluating the patient down here.  Neurology has been paged in consultation we will have him follow along with the intensivist.  No family here at the bedside.  Patient is now over a scan and I plan to go over to review images as they come off in real-time. [RACHELLE]   1007 Critical care time on this patient including management of his airway compromise, and acute seizure activity, 45 minutes.  Procedure note for critical care time and intubation placed in the chart. [RACHELLE]   1012 I discussed the case with   [facility].  She agreed with the plan of management.  She will evaluate the patient personally in consultation.  Chest x-ray has been completed.  Endotracheal tube is in good position.  CT scan imaging not available as of yet. [RACHELLE]   1019 I discussed the CT head personally with Dr. Tiffani Beatty.  We reviewed the images together.  No evidence of acute bleed or abnormality on CT of the head.  Patient will be admitted to the ICU for further care.  Critical care time of 45 minutes has been noted as well as procedure note regarding intubation. [RACHELLE]      ED Course User Index  [RACHELLE] Tyler Gerardo MD     Recent Results (from the past 24 hour(s))   Comprehensive Metabolic Panel    Collection Time: 11/17/21  9:22 AM    Specimen: Blood   Result Value Ref Range    Glucose 236 (H) 65 - 99 mg/dL    BUN 13 6 - 20 mg/dL    Creatinine 1.39 (H) 0.76 - 1.27 mg/dL    Sodium 127 (L) 136 - 145 mmol/L    Potassium 3.9 3.5 - 5.2 mmol/L    Chloride 92 (L) 98 - 107 mmol/L    CO2 13.0 (L) 22.0 - 29.0 mmol/L    Calcium 9.2 8.6 - 10.5 mg/dL    Total Protein 7.4 6.0 - 8.5 g/dL    Albumin 5.00 3.50 - 5.20 g/dL    ALT (SGPT) 13 1 - 41 U/L    AST (SGOT) 14 1 - 40 U/L    Alkaline Phosphatase 143 (H) 39 - 117 U/L    Total Bilirubin 0.2 0.0 - 1.2 mg/dL    eGFR Non African Amer 63 >60 mL/min/1.73    Globulin 2.4 gm/dL    A/G Ratio 2.1 g/dL    BUN/Creatinine Ratio 9.4 7.0 - 25.0    Anion Gap 22.0 (H) 5.0 - 15.0 mmol/L   BNP    Collection Time: 11/17/21  9:22 AM    Specimen: Blood   Result Value Ref Range    proBNP 77.9 0.0 - 450.0 pg/mL   Troponin    Collection Time: 11/17/21  9:22 AM    Specimen: Blood   Result Value Ref Range    Troponin T <0.010 0.000 - 0.030 ng/mL   Green Top (Gel)    Collection Time: 11/17/21  9:22 AM   Result Value Ref Range    Extra Tube Hold for add-ons.    Lavender Top    Collection Time: 11/17/21  9:22 AM   Result Value Ref Range    Extra Tube hold for add-on    Gold Top - SST    Collection Time: 11/17/21  9:22 AM    Result Value Ref Range    Extra Tube Hold for add-ons.    Gray Top    Collection Time: 11/17/21  9:22 AM   Result Value Ref Range    Extra Tube Hold for add-ons.    Light Blue Top    Collection Time: 11/17/21  9:22 AM   Result Value Ref Range    Extra Tube hold for add-on    CBC Auto Differential    Collection Time: 11/17/21  9:22 AM    Specimen: Blood   Result Value Ref Range    WBC 14.18 (H) 3.40 - 10.80 10*3/mm3    RBC 4.68 4.14 - 5.80 10*6/mm3    Hemoglobin 14.1 13.0 - 17.7 g/dL    Hematocrit 40.5 37.5 - 51.0 %    MCV 86.5 79.0 - 97.0 fL    MCH 30.1 26.6 - 33.0 pg    MCHC 34.8 31.5 - 35.7 g/dL    RDW 11.9 (L) 12.3 - 15.4 %    RDW-SD 37.5 37.0 - 54.0 fl    MPV 9.7 6.0 - 12.0 fL    Platelets 461 (H) 140 - 450 10*3/mm3    Neutrophil % 75.8 42.7 - 76.0 %    Lymphocyte % 18.0 (L) 19.6 - 45.3 %    Monocyte % 4.3 (L) 5.0 - 12.0 %    Eosinophil % 0.1 (L) 0.3 - 6.2 %    Basophil % 0.1 0.0 - 1.5 %    Immature Grans % 1.7 (H) 0.0 - 0.5 %    Neutrophils, Absolute 10.74 (H) 1.70 - 7.00 10*3/mm3    Lymphocytes, Absolute 2.55 0.70 - 3.10 10*3/mm3    Monocytes, Absolute 0.61 0.10 - 0.90 10*3/mm3    Eosinophils, Absolute 0.02 0.00 - 0.40 10*3/mm3    Basophils, Absolute 0.02 0.00 - 0.20 10*3/mm3    Immature Grans, Absolute 0.24 (H) 0.00 - 0.05 10*3/mm3    nRBC 0.0 0.0 - 0.2 /100 WBC   Urine Drug Screen - Urine, Clean Catch    Collection Time: 11/17/21  9:44 AM    Specimen: Urine, Clean Catch   Result Value Ref Range    THC, Screen, Urine Negative Negative    Phencyclidine (PCP), Urine Negative Negative    Cocaine Screen, Urine Negative Negative    Methamphetamine, Ur Negative Negative    Opiate Screen Negative Negative    Amphetamine Screen, Urine Negative Negative    Benzodiazepine Screen, Urine Negative Negative    Tricyclic Antidepressants Screen Negative Negative    Methadone Screen, Urine Negative Negative    Barbiturates Screen, Urine Negative Negative    Oxycodone Screen, Urine Negative Negative    Propoxyphene Screen  Negative Negative    Buprenorphine, Screen, Urine Negative Negative   Blood Gas, Arterial With Co-Ox    Collection Time: 11/17/21  9:51 AM    Specimen: Arterial Blood   Result Value Ref Range    Site Right Radial     Isra's Test N/A     pH, Arterial 7.322 (L) 7.350 - 7.450 pH units    pCO2, Arterial 30.8 (L) 35.0 - 45.0 mm Hg    pO2, Arterial 139.0 (H) 83.0 - 108.0 mm Hg    HCO3, Arterial 15.9 (L) 20.0 - 26.0 mmol/L    Base Excess, Arterial -8.8 (L) 0.0 - 2.0 mmol/L    Hemoglobin, Blood Gas 14.6 13.5 - 17.5 g/dL    Hematocrit, Blood Gas 44.7 38.0 - 51.0 %    Oxyhemoglobin 97.9 94 - 99 %    Methemoglobin 0.70 0.00 - 1.50 %    Carboxyhemoglobin 0.4 0 - 2 %    CO2 Content 16.9 (L) 22 - 33 mmol/L    Temperature 37.0 C    Barometric Pressure for Blood Gas      Modality Ventilator     FIO2 50 %    Rate 0 Breaths/minute    PIP 0 cmH2O    IPAP 0     EPAP 0     Note      pH, Temp Corrected 7.322 pH Units    pCO2, Temperature Corrected 30.8 (L) 35 - 48 mm Hg    pO2, Temperature Corrected 139 (H) 83 - 108 mm Hg   ECG 12 Lead    Collection Time: 11/17/21  9:52 AM   Result Value Ref Range    QT Interval 318 ms    QTC Interval 453 ms   COVID-19 and FLU A/B PCR - Swab, Nasopharynx    Collection Time: 11/17/21 10:25 AM    Specimen: Nasopharynx; Swab   Result Value Ref Range    COVID19 Not Detected Not Detected - Ref. Range    Influenza A PCR Not Detected Not Detected    Influenza B PCR Not Detected Not Detected   Blood Gas, Arterial With Co-Ox    Collection Time: 11/17/21 12:46 PM    Specimen: Arterial Blood   Result Value Ref Range    Site Left Radial     Isra's Test N/A     pH, Arterial 7.389 7.350 - 7.450 pH units    pCO2, Arterial 27.8 (L) 35.0 - 45.0 mm Hg    pO2, Arterial 122.0 (H) 83.0 - 108.0 mm Hg    HCO3, Arterial 16.8 (L) 20.0 - 26.0 mmol/L    Base Excess, Arterial -6.7 (L) 0.0 - 2.0 mmol/L    Hemoglobin, Blood Gas 14.1 13.5 - 17.5 g/dL    Hematocrit, Blood Gas 43.3 38.0 - 51.0 %    Oxyhemoglobin 97.9 94 - 99 %     Methemoglobin 0.80 0.00 - 1.50 %    Carboxyhemoglobin 0.3 0 - 2 %    CO2 Content 17.6 (L) 22 - 33 mmol/L    Temperature 37.0 C    Barometric Pressure for Blood Gas      Modality Ventilator     FIO2 35 %    Ventilator Mode       Note      pH, Temp Corrected 7.389 pH Units    pCO2, Temperature Corrected 27.8 (L) 35 - 48 mm Hg    pO2, Temperature Corrected 122 (H) 83 - 108 mm Hg     Note: In addition to lab results from this visit, the labs listed above may include labs taken at another facility or during a different encounter within the last 24 hours. Please correlate lab times with ED admission and discharge times for further clarification of the services performed during this visit.    XR Abdomen KUB   Preliminary Result   There Is a gastric tube identified tip in the stomach. The   bowel gas pattern is nonspecific.              CT Head Without Contrast   Preliminary Result   No acute intracranial abnormality.       NOTE: Examination was performed 11/17/2021 at 10:08 AM.       D:  11/17/2021   E:  11/17/2021              XR Chest 1 View   Preliminary Result   No acute cardiopulmonary disease.       D:  11/17/2021   E:  11/17/2021                  XR Chest 1 View    (Results Pending)   XR Chest 1 View    (Results Pending)     Vitals:    11/17/21 1511 11/17/21 1517 11/17/21 1530 11/17/21 1540   BP:   129/73 134/71   BP Location:       Patient Position:       Pulse:  88 97 89   Resp:       Temp: (!) 101.1 °F (38.4 °C)      TempSrc: Bladder      SpO2:  100% 99% 99%   Weight:       Height:         Medications   sodium chloride 0.9 % flush 10 mL (has no administration in time range)   LORazepam (ATIVAN) 2 MG/ML injection  - ADS Override Pull (  Not Given 11/17/21 1027)   sodium chloride 0.9 % infusion (250 mL/hr Intravenous New Bag 11/17/21 1511)   fentaNYL 2500 mcg/250 mL NS infusion (50 mcg/hr Intravenous Rate Change (DUAL SIGN) 11/17/21 1542)   sodium chloride 0.9 % flush 10 mL (10 mL Intravenous Given 11/17/21 1335)    sodium chloride 0.9 % flush 10 mL (has no administration in time range)   chlorhexidine (PERIDEX) 0.12 % solution 15 mL (has no administration in time range)   enoxaparin (LOVENOX) syringe 40 mg (40 mg Subcutaneous Given 11/17/21 1323)   famotidine (PEPCID) injection 20 mg (20 mg Intravenous Given 11/17/21 1323)   acetaminophen (TYLENOL) tablet 650 mg ( Oral Not Given:  See Alt 11/17/21 1315)     Or   acetaminophen (TYLENOL) suppository 650 mg (650 mg Rectal Given 11/17/21 1315)   albuterol (PROVENTIL) nebulizer solution 0.083% 2.5 mg/3mL (has no administration in time range)   ondansetron (ZOFRAN) injection 4 mg (has no administration in time range)   levETIRAcetam in NaCl 0.82% (KEPPRA) IVPB 500 mg (0 mg Intravenous Hold 11/17/21 1353)   midazolam (VERSED) 100 mg in 100mL NS infusion (2.5 mg/hr Intravenous Rate/Dose Change 11/17/21 1540)   OXcarbazepine (TRILEPTAL) tablet 1,200 mg (has no administration in time range)   OXcarbazepine (TRILEPTAL) tablet 900 mg (has no administration in time range)   OXcarbazepine (TRILEPTAL) tablet 1,200 mg (has no administration in time range)   topiramate (TOPAMAX) tablet 200 mg (has no administration in time range)   levETIRAcetam in NaCl 0.75% (KEPPRA) IVPB 1,000 mg (0 mg Intravenous Stopped 11/17/21 1324)   levETIRAcetam in NaCl 0.75% (KEPPRA) IVPB 1,000 mg (0 mg Intravenous Stopped 11/17/21 1032)     ECG/EMG Results (last 24 hours)     Procedure Component Value Units Date/Time    ECG 12 Lead [[id number]] Collected: 11/17/21 0952     Updated: 11/17/21 1234     QT Interval 318 ms      QTC Interval 453 ms     Narrative:      Test Reason : SOA Protocol  Blood Pressure :   */*   mmHG  Vent. Rate : 122 BPM     Atrial Rate : 128 BPM     P-R Int : 154 ms          QRS Dur : 106 ms      QT Int : 318 ms       P-R-T Axes :  56  25 -42 degrees     QTc Int : 453 ms    Sinus tachycardia  Inferior infarct , age undetermined  Abnormal ECG  No previous ECGs available  Confirmed by YAIR   TYLER ALLISON ([id number]) on 11/17/2021 12:33:47 PM    Referred By: YAIR           Confirmed By: TYLER GERARDO MD        ECG 12 Lead   Final Result   Test Reason : SOA Protocol   Blood Pressure :   */*   mmHG   Vent. Rate : 122 BPM     Atrial Rate : 128 BPM      P-R Int : 154 ms          QRS Dur : 106 ms       QT Int : 318 ms       P-R-T Axes :  56  25 -42 degrees      QTc Int : 453 ms      Sinus tachycardia   Inferior infarct , age undetermined   Abnormal ECG   No previous ECGs available   Confirmed by TYLER GERARDO MD ([id number]) on 11/17/2021 12:33:47 PM      Referred By: YAIR           Confirmed By: TYLER GERARDO MD                                                MDM    Final diagnoses:   Seizure (HCC)   Status epilepticus (HCC)   Acute respiratory failure with hypoxia (HCC)   Hyponatremia   Leukocytosis, unspecified type       ED Disposition  ED Disposition     ED Disposition Condition Comment    Decision to Admit  Level of Care: Critical Care [6]   Admitting Physician: IRAIS MORALES [[id number]   Attending Physician: IRAIS MORALES [[id number]   Patient Class: Inpatient [101]            No follow-up provider specified.       Medication List      No changes were made to your prescriptions during this visit.          Tyler Gerardo MD  11/17/21 1546

## 2021-11-17 NOTE — H&P
"Pulmonary/Critical Care History and Physical Exam     LOS: 0 days   Patient Care Team:  Provider, No Known as PCP - Joseph Lane MD as Consulting Physician (Neurology)    Chief Complaint:    Chief Complaint   Patient presents with   • Seizures     Subjective     HPI:   Saturnino Henderson is a 21 y.o. male, non-smoker, with a past medical history significant for traumatic brain injury, seizures, hypertension, and retinal detachment.  The patient presents to [facility] ED via EMS for seizure activity.  Information is obtained from the patient's wife as he is intubated and poorly responsive.  She reports that he had a seizure which lasted approximately 1 minute at 4 AM this morning.  He again had a seizure which lasted approximately 25 minutes at proximately 825 AM this morning at which time she called EMS.  On their arrival to the scene attempt was made to intubate the patient as his oxygen saturations were found to be 60-70%, and he was otherwise unable to protect his airway.  He continued to seize, and oxygen saturation remained low following intubation.  There was concern that intubation was unsuccessful.  On arrival to ED patient appeared to be postictal and was intubated successfully.  He did not appear to move his left side, was tumescent.  With a concern for other neurological issues the patient was taken emergently for CT scan further evaluation.  This did not show evidence of intracranial abnormality.  He was notably incontinent of urine with estimate of approximately 800 mL by nursing staff.  Significant labs: ABG pH 7.32/PCO2 30.2/PO2 139/bicarb 15.9/base deficit 8.8, glucose 236, sodium 127, creatinine 1.39, WBCs 14.18, BNP 77.9.    He is followed by neurology in [place] for his seizures.  It appears that the patient has taken Topamax, and Trileptal in the past. His wife reports that he has been vaccinated for Covid-19, and that the second vaccination \"caused a seizure\" which occurred " later the day of vaccination.  Prior to presentation she reports that he had been in his normal state of health, and denied recent fever, chills, chest pain or shortness of air, nausea/vomiting/constipation/diarrhea.  He has however had worsening headaches/migraines recently.  She reports that his seizure activity seems to worsen with stress, and sleep deprivation.    The patient developed agitation when he was taken for a head CT scan and was right side/thrashing about.  He was apparently left hemiparetic at that time.  Additional sedation was given.    On my evaluation, the patient's sedated and unresponsive.  He is on Versed and fentanyl drips.    History taken from: Patient, and chart    Past Medical History:   Diagnosis Date   • Seizures (HCC)    • TBI (traumatic brain injury) (HCC)     Age 5       History reviewed. No pertinent surgical history.    History reviewed. No pertinent family history.    Social History     Socioeconomic History   • Marital status:    Tobacco Use   • Smoking status: Never Smoker   • Smokeless tobacco: Never Used   Substance and Sexual Activity   • Alcohol use: Yes     Comment: every other week   • Drug use: Never       Allergies   Allergen Reactions   • Bee Pollen Anaphylaxis     Bee stings   • Bee Venom Swelling     Swelling of the face and the sting area      • Keppra [Levetiracetam] Other (See Comments) and Hallucinations     Father reports extreme hallucinations and aggressiveness with Keppra       PMH/FH/SocH were reviewed by me and updates were made.     Review of Systems:    Review of 14 systems was completed with positives and pertinent negatives noted in the subjective section.  All other systems reviewed and are negative.   Exceptions are noted below:    Unable to obtain secondary to unresponsiveness/mechanical ventilation.    Objective     Vital Signs  Temp:  [100.4 °F (38 °C)-101.8 °F (38.8 °C)] 101.5 °F (38.6 °C)  Heart Rate:  [] 90  Resp:  [24] 24  BP:  ()/(48-73) 119/62  Body mass index is 28.48 kg/m².       Physical Exam:     General Appearance:   Sedated on ventilator, in no acute distress   Head:    Normocephalic, without obvious abnormality, atraumatic   Eyes:            Lids and lashes normal, conjunctivae and sclerae normal, no   icterus, no pallor, corneas clear, PERRL   ENMT:   Ears appear intact with no abnormalities noted    Oral endotracheal tube in place   Neck:   No adenopathy, supple, trachea midline, no thyromegaly, no   carotid bruit, no JVD   Lungs/resp:    On ventilator, symmetric chest rise, no crepitus, clear to      auscultation bilaterally, no chest wall tenderness                  Heart/CV:    Regular rhythm and normal rate, normal S1 and S2, no         murmur   Abdomen/GI:     Normal bowel sounds, no masses, no organomegaly, soft,    nontender, nondistended   G/U:     Deferred   Extremities/MSK:   No clubbing, cyanosis or edema.  No deformities.    Pulses:   Pulses palpable and equal bilaterally   Skin:   No bleeding, bruising or rash   Hem/Lymph:   No cervical or supraclavicular adenopathy.    Neurologic:  Sedated.  No response to noxious stimulation.  No current cough reflex.  Positive doll's eyes.            Psychiatric: Sedated, nonagitated.      Results Review:     I reviewed the patient's new clinical results.   Results from last 7 days   Lab Units 11/17/21  0922   SODIUM mmol/L 127*   POTASSIUM mmol/L 3.9   CHLORIDE mmol/L 92*   CO2 mmol/L 13.0*   BUN mg/dL 13   CREATININE mg/dL 1.39*   CALCIUM mg/dL 9.2   BILIRUBIN mg/dL 0.2   ALK PHOS U/L 143*   ALT (SGPT) U/L 13   AST (SGOT) U/L 14   GLUCOSE mg/dL 236*     Results from last 7 days   Lab Units 11/17/21  0922   WBC 10*3/mm3 14.18*   HEMOGLOBIN g/dL 14.1   HEMATOCRIT % 40.5   PLATELETS 10*3/mm3 461*     Results from last 7 days   Lab Units 11/17/21  1246   PH, ARTERIAL pH units 7.389   PO2 ART mm Hg 122.0*   PCO2, ARTERIAL mm Hg 27.8*   HCO3 ART mmol/L 16.8*       I reviewed  the patient's new imaging including images and reports.    CT head 11/17/2021:IMPRESSION:  No acute intracranial abnormality.    Chest x-ray 11/17/2021 shows endotracheal tube in place with no acute radiographic chest abnormality.    Medication Review:   chlorhexidine, 15 mL, Mouth/Throat, Q12H  enoxaparin, 40 mg, Subcutaneous, Q24H  famotidine, 20 mg, Intravenous, BID  levETIRAcetam, 500 mg, Intravenous, Q12H  LORazepam, , ,   OXcarbazepine, 1,200 mg, Oral, Once  OXcarbazepine, 1,200 mg, Oral, Nightly  [START ON 11/18/2021] OXcarbazepine, 900 mg, Oral, Daily  sodium chloride, 10 mL, Intravenous, Q12H  topiramate, 200 mg, Oral, Q12H      fentanyl 10 mcg/mL,  mcg/hr, Last Rate: 100 mcg/hr (11/17/21 1442)  midazolam, 1-10 mg/hr, Last Rate: 4.5 mg/hr (11/17/21 1447)  sodium chloride, 250 mL/hr, Last Rate: 250 mL/hr (11/17/21 1324)        Assessment/Plan     Active Hospital Problems    Diagnosis    • **Status epilepticus (HCC)    • Acute hypoxemic respiratory failure (HCC)    • Essential hypertension      Formatting of this note might be different from the original.  new onset - start lisinopril 10 mg qd.  recheck 3 mo.  check labs       DURGA Castrejon, [facility]  Pulmonology and Critical Care Medicine  Electronically signed by DURGA Tubbs, 11/17/21, 10:49 AM EST.    I performed an independent history and physical examination. Portions of the history were obtained by DURGA and were modified by me according to my findings. The above note reflects my findings, assessment, and plan.    Franklin Vigil MD    21 y.o. male non-smoker with history of traumatic brain injury, seizure disorder, hypertension, retinal detachment who was brought to [facility] emergency department by EMS for persistent seizures.  He apparently had had a seizure for 1 minute at 4 AM the morning of admission.  At around 8:25 AM, he had a 25-minute seizure and his wife called EMS.  Oxygen saturations on EMS  "arrival were reportedly 60 to 70%.  He was felt to be unable to protect his airway so he was apparently intubated by EMS.  His oxygen saturations did not significantly improve so he was subsequently extubated.  He was reintubated on arrival to the emergency department with adequate oxygen saturations.    Initial CT of the head showed no significant abnormality.  Patient became agitated and was thrashing about on the right however had left hemiparesis.  EEG was done and reportedly shows no active seizures.    Oxygen saturations are currently adequate on mechanical ventilation.  He has been seen by neurology who has started him on Topamax and Trileptal.  He received Keppra initially.  His father reported that he has had combativeness with Keppra in the past.    The patient will be admitted to the intensive care unit for ongoing management of hypoxemic respiratory failure and status epilepticus.  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  No recent infectious symptoms.    Plan:  1.  For seizure/status epilepticus: No active seizing on EEG.  Neurology following.  Trileptal and Topamax.  2.  For acute hypoxemic respiratory failure: Wean sedation as tolerated.  If seizures remain controlled could attempt ventilator weaning.  3.  For hypertension: Monitor with antihypertensives as necessary.  4.  DVT prophylaxis: Lovenox.    The patient is critically ill secondary to acute hypoxemic respiratory failure in the setting of status epilepticus and has high risk of life-threatening decline in condition.  As such, he requires continuous monitoring frequent reassessment for consideration of adjustment in management to minimize this risk.  I personally reassessed him multiple times today.    Critical care time : 45 minutes spent by me personally.(This excludes time spent performing separately reportable procedures and services). including high complexity decision making to assess, manipulate, and support vital " organ system failure in this individual who has impairment of one or more vital organ systems such that there is a high probability of imminent or life threatening deterioration in the patient’s condition.    Electronically signed by: Franklin Vigil MD, 11/17/21, 3:29 PM EST.      • Please note that portions of this note were completed with [employer] - a voice recognition program.     Addendum: Patient has developed fevers to 101.8 Fahrenheit.  No obvious source of infection.  This could be secondary to seizure activity however I will cover empirically for meningitis.  Discussed with neurology who will obtain an MRI.  Also sent blood cultures x2, procalcitonin, lactic acid level.    Electronically signed by: Franklin Vigil MD, 11/17/21, 3:57 PM EST.

## 2021-11-17 NOTE — CASE MANAGEMENT/SOCIAL WORK
Discharge Planning Assessment  [facility]     Patient Name: Saturnino Henderson  MRN: [id number]  Today's Date: 11/17/2021    Admit Date: 11/17/2021     Discharge Needs Assessment     Row Name 11/17/21 1042       Living Environment    Lives With spouse    Name(s) of Who Lives With Patient Lives with spouse, Edwina Saini, in [place]    Current Living Arrangements home/apartment/condo    Primary Care Provided by self    Provides Primary Care For no one    Caregiving Concerns Spouse reports patient is independent with ADL's    Family Caregiver if Needed spouse    Family Caregiver Names Spouse, Edwina Saini @ [phone]    Quality of Family Relationships helpful; involved; supportive    Able to Return to Prior Arrangements yes    Living Arrangement Comments Resides in private residence with spouse       Resource/Environmental Concerns    Resource/Environmental Concerns none    Transportation Concerns car, none       Transition Planning    Patient/Family Anticipates Transition to home    Patient/Family Anticipated Services at Transition     Transportation Anticipated family or friend will provide       Discharge Needs Assessment    Readmission Within the Last 30 Days no previous admission in last 30 days    Equipment Currently Used at Home none    Concerns to be Addressed discharge planning    Concerns Comments Case Management following for all needs/discharge planning    Equipment Needed After Discharge other (see comments)-TBD    Outpatient/Agency/Support Group Needs other (see comments)-TBD    Discharge Facility/Level of Care Needs other (see comments)-TBD    Patient's Choice of Community Agency(s) TBD based on need    Discharge Coordination/Progress Ongoing               Discharge Plan     Row Name 11/17/21 1047       Plan    Plan TBD    Patient/Family in Agreement with Plan yes  spouse    Plan Comments Planning ICU admission, intubated in ED.  Met with spouse at bedside, reports patient  normally independent with all ADL's with no DME use or home health involvement.  Spouse made aware Case Management will be following for all needs, appreciative.    Final Discharge Disposition Code 30 - still a patient              Continued Care and Services - Admitted Since 11/17/2021    Coordination has not been started for this encounter.          Demographic Summary     Row Name 11/17/21 1040       General Information    Arrived From emergency department; home    Referral Source admission list; emergency department    Reason for Consult discharge planning    Preferred Language English     Used During This Interaction no               Functional Status    No documentation.                Psychosocial    No documentation.                Abuse/Neglect    No documentation.                Legal    No documentation.                Substance Abuse    No documentation.                Patient Forms    No documentation.                   MATTEO Haas

## 2021-11-18 ENCOUNTER — APPOINTMENT (OUTPATIENT)
Dept: GENERAL RADIOLOGY | Facility: HOSPITAL | Age: 21
End: 2021-11-18

## 2021-11-18 LAB
ANION GAP SERPL CALCULATED.3IONS-SCNC: 11 MMOL/L (ref 5–15)
BACTERIA UR QL AUTO: NORMAL /HPF
BASOPHILS # BLD MANUAL: 0 10*3/MM3 (ref 0–0.2)
BASOPHILS NFR BLD MANUAL: 0 % (ref 0–1.5)
BILIRUB UR QL STRIP: NEGATIVE
BUN SERPL-MCNC: 17 MG/DL (ref 6–20)
BUN/CREAT SERPL: 11.7 (ref 7–25)
CALCIUM SPEC-SCNC: 9.6 MG/DL (ref 8.6–10.5)
CHLORIDE SERPL-SCNC: 107 MMOL/L (ref 98–107)
CLARITY UR: ABNORMAL
CO2 SERPL-SCNC: 17 MMOL/L (ref 22–29)
COLOR UR: YELLOW
CREAT SERPL-MCNC: 1.45 MG/DL (ref 0.76–1.27)
D-LACTATE SERPL-SCNC: 0.7 MMOL/L (ref 0.5–2)
DEPRECATED RDW RBC AUTO: 39.8 FL (ref 37–54)
EOSINOPHIL # BLD MANUAL: 0 10*3/MM3 (ref 0–0.4)
EOSINOPHIL NFR BLD MANUAL: 0 % (ref 0.3–6.2)
ERYTHROCYTE [DISTWIDTH] IN BLOOD BY AUTOMATED COUNT: 12.2 % (ref 12.3–15.4)
GFR SERPL CREATININE-BSD FRML MDRD: 60 ML/MIN/1.73
GLUCOSE SERPL-MCNC: 103 MG/DL (ref 65–99)
GLUCOSE UR STRIP-MCNC: NEGATIVE MG/DL
HCT VFR BLD AUTO: 37.1 % (ref 37.5–51)
HGB BLD-MCNC: 12.6 G/DL (ref 13–17.7)
HGB UR QL STRIP.AUTO: ABNORMAL
HYALINE CASTS UR QL AUTO: NORMAL /LPF
KETONES UR QL STRIP: ABNORMAL
LEUKOCYTE ESTERASE UR QL STRIP.AUTO: NEGATIVE
LYMPHOCYTES # BLD MANUAL: 0.92 10*3/MM3 (ref 0.7–3.1)
LYMPHOCYTES NFR BLD MANUAL: 4 % (ref 5–12)
MAGNESIUM SERPL-MCNC: 2.6 MG/DL (ref 1.6–2.6)
MCH RBC QN AUTO: 30.1 PG (ref 26.6–33)
MCHC RBC AUTO-ENTMCNC: 34 G/DL (ref 31.5–35.7)
MCV RBC AUTO: 88.8 FL (ref 79–97)
MONOCYTES # BLD: 0.61 10*3/MM3 (ref 0.1–0.9)
NEUTROPHILS # BLD AUTO: 13.77 10*3/MM3 (ref 1.7–7)
NEUTROPHILS NFR BLD MANUAL: 85 % (ref 42.7–76)
NEUTS BAND NFR BLD MANUAL: 5 % (ref 0–5)
NITRITE UR QL STRIP: NEGATIVE
PH UR STRIP.AUTO: 5.5 [PH] (ref 5–8)
PHOSPHATE SERPL-MCNC: 5.3 MG/DL (ref 2.5–4.5)
PLAT MORPH BLD: NORMAL
PLATELET # BLD AUTO: 282 10*3/MM3 (ref 140–450)
PMV BLD AUTO: 9.9 FL (ref 6–12)
POTASSIUM SERPL-SCNC: 4.6 MMOL/L (ref 3.5–5.2)
PROT UR QL STRIP: NEGATIVE
RBC # BLD AUTO: 4.18 10*6/MM3 (ref 4.14–5.8)
RBC # UR STRIP: NORMAL /HPF
RBC MORPH BLD: NORMAL
REF LAB TEST METHOD: NORMAL
SCAN SLIDE: NORMAL
SODIUM SERPL-SCNC: 135 MMOL/L (ref 136–145)
SP GR UR STRIP: 1.01 (ref 1–1.03)
SQUAMOUS #/AREA URNS HPF: NORMAL /HPF
URATE CRY URNS QL MICRO: NORMAL /HPF
UROBILINOGEN UR QL STRIP: ABNORMAL
VARIANT LYMPHS NFR BLD MANUAL: 6 % (ref 19.6–45.3)
WBC # UR STRIP: NORMAL /HPF
WBC MORPH BLD: NORMAL
WBC NRBC COR # BLD: 15.3 10*3/MM3 (ref 3.4–10.8)

## 2021-11-18 PROCEDURE — 25010000002 LORAZEPAM PER 2 MG: Performed by: PSYCHIATRY & NEUROLOGY

## 2021-11-18 PROCEDURE — 71045 X-RAY EXAM CHEST 1 VIEW: CPT

## 2021-11-18 PROCEDURE — 84100 ASSAY OF PHOSPHORUS: CPT | Performed by: NURSE PRACTITIONER

## 2021-11-18 PROCEDURE — 94003 VENT MGMT INPAT SUBQ DAY: CPT

## 2021-11-18 PROCEDURE — 83605 ASSAY OF LACTIC ACID: CPT | Performed by: NURSE PRACTITIONER

## 2021-11-18 PROCEDURE — 94799 UNLISTED PULMONARY SVC/PX: CPT

## 2021-11-18 PROCEDURE — 92610 EVALUATE SWALLOWING FUNCTION: CPT

## 2021-11-18 PROCEDURE — 99233 SBSQ HOSP IP/OBS HIGH 50: CPT | Performed by: INTERNAL MEDICINE

## 2021-11-18 PROCEDURE — 0 GADOBENATE DIMEGLUMINE 529 MG/ML SOLUTION: Performed by: INTERNAL MEDICINE

## 2021-11-18 PROCEDURE — 83735 ASSAY OF MAGNESIUM: CPT | Performed by: NURSE PRACTITIONER

## 2021-11-18 PROCEDURE — 25010000002 FENTANYL 10 MCG/1 ML NS: Performed by: NURSE PRACTITIONER

## 2021-11-18 PROCEDURE — 25010000002 DEXAMETHASONE PER 1 MG: Performed by: INTERNAL MEDICINE

## 2021-11-18 PROCEDURE — 81001 URINALYSIS AUTO W/SCOPE: CPT | Performed by: NURSE PRACTITIONER

## 2021-11-18 PROCEDURE — 80048 BASIC METABOLIC PNL TOTAL CA: CPT | Performed by: NURSE PRACTITIONER

## 2021-11-18 PROCEDURE — 25010000002 PROPOFOL 10 MG/ML EMULSION: Performed by: NURSE PRACTITIONER

## 2021-11-18 PROCEDURE — 99232 SBSQ HOSP IP/OBS MODERATE 35: CPT | Performed by: PSYCHIATRY & NEUROLOGY

## 2021-11-18 PROCEDURE — 85007 BL SMEAR W/DIFF WBC COUNT: CPT | Performed by: NURSE PRACTITIONER

## 2021-11-18 PROCEDURE — 25010000002 CEFTRIAXONE PER 250 MG: Performed by: INTERNAL MEDICINE

## 2021-11-18 PROCEDURE — A9577 INJ MULTIHANCE: HCPCS | Performed by: INTERNAL MEDICINE

## 2021-11-18 PROCEDURE — 25010000002 VANCOMYCIN 10 G RECONSTITUTED SOLUTION

## 2021-11-18 PROCEDURE — 85025 COMPLETE CBC W/AUTO DIFF WBC: CPT | Performed by: NURSE PRACTITIONER

## 2021-11-18 PROCEDURE — 25010000002 ENOXAPARIN PER 10 MG: Performed by: NURSE PRACTITIONER

## 2021-11-18 RX ORDER — LORAZEPAM 2 MG/ML
0.5 INJECTION INTRAMUSCULAR ONCE
Status: COMPLETED | OUTPATIENT
Start: 2021-11-18 | End: 2021-11-18

## 2021-11-18 RX ADMIN — PROPOFOL 50 MCG/KG/MIN: 10 INJECTION, EMULSION INTRAVENOUS at 02:24

## 2021-11-18 RX ADMIN — ENOXAPARIN SODIUM 40 MG: 40 INJECTION SUBCUTANEOUS at 11:39

## 2021-11-18 RX ADMIN — TOPIRAMATE 200 MG: 100 TABLET, FILM COATED ORAL at 19:49

## 2021-11-18 RX ADMIN — OXCARBAZEPINE 1200 MG: 300 TABLET, FILM COATED ORAL at 01:25

## 2021-11-18 RX ADMIN — Medication 200 MCG/HR: at 07:56

## 2021-11-18 RX ADMIN — FAMOTIDINE 20 MG: 10 INJECTION, SOLUTION INTRAVENOUS at 19:49

## 2021-11-18 RX ADMIN — OXCARBAZEPINE 1200 MG: 300 TABLET, FILM COATED ORAL at 19:48

## 2021-11-18 RX ADMIN — VANCOMYCIN HYDROCHLORIDE 1250 MG: 10 INJECTION, POWDER, LYOPHILIZED, FOR SOLUTION INTRAVENOUS at 08:58

## 2021-11-18 RX ADMIN — SODIUM CHLORIDE, PRESERVATIVE FREE 10 ML: 5 INJECTION INTRAVENOUS at 08:56

## 2021-11-18 RX ADMIN — PROPOFOL 45 MCG/KG/MIN: 10 INJECTION, EMULSION INTRAVENOUS at 05:27

## 2021-11-18 RX ADMIN — SODIUM CHLORIDE 250 ML/HR: 9 INJECTION, SOLUTION INTRAVENOUS at 01:18

## 2021-11-18 RX ADMIN — DEXAMETHASONE SODIUM PHOSPHATE 4 MG: 4 INJECTION, SOLUTION INTRA-ARTICULAR; INTRALESIONAL; INTRAMUSCULAR; INTRAVENOUS; SOFT TISSUE at 11:39

## 2021-11-18 RX ADMIN — SODIUM CHLORIDE 2 G: 900 INJECTION INTRAVENOUS at 05:27

## 2021-11-18 RX ADMIN — LORAZEPAM 0.5 MG: 2 INJECTION INTRAMUSCULAR; INTRAVENOUS at 17:17

## 2021-11-18 RX ADMIN — ACETAMINOPHEN 650 MG: 325 TABLET, FILM COATED ORAL at 19:49

## 2021-11-18 RX ADMIN — OXCARBAZEPINE 900 MG: 300 TABLET, FILM COATED ORAL at 08:55

## 2021-11-18 RX ADMIN — GADOBENATE DIMEGLUMINE 19 ML: 529 INJECTION, SOLUTION INTRAVENOUS at 00:12

## 2021-11-18 RX ADMIN — FAMOTIDINE 20 MG: 10 INJECTION, SOLUTION INTRAVENOUS at 08:56

## 2021-11-18 RX ADMIN — PROPOFOL 45 MCG/KG/MIN: 10 INJECTION, EMULSION INTRAVENOUS at 07:56

## 2021-11-18 RX ADMIN — SODIUM CHLORIDE 100 ML/HR: 9 INJECTION, SOLUTION INTRAVENOUS at 16:46

## 2021-11-18 RX ADMIN — LORAZEPAM 0.5 MG: 2 INJECTION INTRAMUSCULAR; INTRAVENOUS at 16:46

## 2021-11-18 RX ADMIN — DEXAMETHASONE SODIUM PHOSPHATE 4 MG: 4 INJECTION, SOLUTION INTRA-ARTICULAR; INTRALESIONAL; INTRAMUSCULAR; INTRAVENOUS; SOFT TISSUE at 01:25

## 2021-11-18 RX ADMIN — SODIUM CHLORIDE 250 ML/HR: 9 INJECTION, SOLUTION INTRAVENOUS at 05:00

## 2021-11-18 RX ADMIN — SODIUM CHLORIDE, PRESERVATIVE FREE 10 ML: 5 INJECTION INTRAVENOUS at 20:09

## 2021-11-18 RX ADMIN — DEXAMETHASONE SODIUM PHOSPHATE 4 MG: 4 INJECTION, SOLUTION INTRA-ARTICULAR; INTRALESIONAL; INTRAMUSCULAR; INTRAVENOUS; SOFT TISSUE at 05:27

## 2021-11-18 RX ADMIN — TOPIRAMATE 200 MG: 100 TABLET, FILM COATED ORAL at 08:56

## 2021-11-18 NOTE — PLAN OF CARE
Problem: Adult Inpatient Plan of Care  Goal: Plan of Care Review  Outcome: Ongoing, Progressing   Goal Outcome Evaluation:         Patient is inappropriate and very spontaneous. Will get OOB with rails up, IV attached, monitors attached. Continually removing monitors. Can be aggressive at times. RUSTAM ESPINOSA, oriented to self and situation. Uses inappropriate words for month. On RA, sat 92%, loose cough. NSR-ST with -150. NPO for now per SLP, voids per urinal. Afebrile.

## 2021-11-18 NOTE — PLAN OF CARE
Goal Outcome Evaluation:  Plan of Care Reviewed With: patient, spouse        Progress: improving  Outcome Summary: Pt arrived to ICU around 2230. Pt agitated upon arrival; sedation gtts increased. MRI of brain completed. Attempted to wean sedation; pt following commands (equal all 4 extremities) and restless this morning; sedation gtts increased. Thick, brown respiratory secretions; sputum culture sent.

## 2021-11-18 NOTE — NURSING NOTE
Patients wife came out to desk saying he was getting out of bed and pulling things off. He was out of bed, over side rail, pulled one IV out, IV pole was in bed, bleeding on floor from IV he pulled out, all monitors were off. Avina still attached to bed and he was almost to bathroom.

## 2021-11-18 NOTE — NURSING NOTE
Patient emotionally immature. Inappropriate response to being chilly and blood pressure cuff inflating. Keeps taking off pulse ox, BP cuff and leads.

## 2021-11-18 NOTE — PROGRESS NOTES
"Clinical Nutrition Note      Patient Name: Saturnino Henderson  MRN: [id number]  Admission date: 11/17/2021      Multidisciplinary Rounds    Additional information obtained during MDR:  RN reports pt adm w/ seizures, has hx of seizures; Keppra is not to be given as its makes him agitated. He awake and alert  this morning, MD lucia w/ PST and extubation.      Current diet: NPO Diet    Oral Nutrition Supplement:     Pertinent medical data reviewed:  No nutrition risk identified on nursing screen; MST score \"0\"    Average oral intake per documentation:              Intervention:  Plan of care and goals of care reviewed    Monitor:  RD to follow per protocol      Valarie Fam MS,RD,LD  11/18/21 10:45 EST  Time: 15  mins       "

## 2021-11-18 NOTE — PROGRESS NOTES
"INTENSIVIST NOTE     Hospital Day: 1    Mr. Saturnino Henderson, 21 y.o. male is followed for:   Status epilepticus       SUBJECTIVE     Interval history:    On mechanical ventilation.  No visible seizure activity.  No further temperature greater than 100.5.  Fluid balance +1.9 L.    The patient's relevant past medical, surgical and social history were reviewed and updated in Epic as appropriate.       OBJECTIVE     Vital Sign Min/Max for last 24 hours  Temp  Min: 96.3 °F (35.7 °C)  Max: 101.1 °F (38.4 °C)   BP  Min: 92/55  Max: 174/64   Pulse  Min: 42  Max: 142   Resp  Min: 14  Max: 20   SpO2  Min: 86 %  Max: 100 %   No data recorded   Weight  Min: 91.3 kg (201 lb 4.5 oz)  Max: 91.3 kg (201 lb 4.5 oz)      Intake/Output Summary (Last 24 hours) at 11/18/2021 1453  Last data filed at 11/18/2021 1330  Gross per 24 hour   Intake 6313.18 ml   Output 4350 ml   Net 1963.18 ml      Flowsheet Rows      First Filed Value   Admission Height 182.9 cm (72\") Documented at 11/17/2021 0924   Admission Weight 95.3 kg (210 lb) Documented at 11/17/2021 0924             11/17/21  0924 11/17/21 2217   Weight: 95.3 kg (210 lb) 91.3 kg (201 lb 4.5 oz)            Objective:  General Appearance:  In no acute distress.    Vital signs: (most recent): Blood pressure 137/50, pulse 88, temperature 100.4 °F (38 °C), resp. rate 20, height 182.9 cm (72.01\"), weight 91.3 kg (201 lb 4.5 oz), SpO2 98 %.    HEENT: (Oral ET tube)    Lungs:  Normal effort and normal respiratory rate.  Breath sounds clear to auscultation.  He is not in respiratory distress.  No rales, wheezes or rhonchi.    Heart: Normal rate.  Regular rhythm.  S1 normal and S2 normal.  No murmur, gallop or friction rub.   Chest: Symmetric chest wall expansion.   Abdomen: Abdomen is soft and non-distended.  Bowel sounds are normal.   There is no abdominal tenderness.   There is no mass. There is no splenomegaly. There is no hepatomegaly.   Extremities: There is no deformity or dependent " edema.    Neurological: (Sedated).    Pupils:  Pupils are equal, round, and reactive to light.    Skin:  Warm and dry.              I reviewed the patient's new clinical results.  I reviewed the patient's new imaging results/reports including actual images and agree with reports.      Chest X-Ray: No infiltrates    INFUSIONS  dexmedetomidine, 0.2-1.5 mcg/kg/hr, Last Rate: Stopped (11/18/21 0134)  Pharmacy to dose vancomycin,   sodium chloride, 100 mL/hr, Last Rate: 100 mL/hr (11/18/21 0812)        Results from last 7 days   Lab Units 11/18/21  0227 11/17/21  0922   WBC 10*3/mm3 15.30* 14.18*   HEMOGLOBIN g/dL 12.6* 14.1   HEMATOCRIT % 37.1* 40.5   PLATELETS 10*3/mm3 282 461*     Results from last 7 days   Lab Units 11/18/21 0227 11/17/21  0922   SODIUM mmol/L 135* 127*   POTASSIUM mmol/L 4.6 3.9   CHLORIDE mmol/L 107 92*   CO2 mmol/L 17.0* 13.0*   BUN mg/dL 17 13   GLUCOSE mg/dL 103* 236*   CREATININE mg/dL 1.45* 1.39*   MAGNESIUM mg/dL 2.6  --    CALCIUM mg/dL 9.6 9.2         Results from last 7 days   Lab Units 11/17/21  1246 11/17/21  0951   PH, ARTERIAL pH units 7.389 7.322*   PCO2, ARTERIAL mm Hg 27.8* 30.8*   PO2 ART mm Hg 122.0* 139.0*   FIO2 % 35 50         Mech Ventilation: FiO2 (%):  [30 %-45 %] 40 %  S RR:  [14] 14  PEEP/CPAP (cm H2O):  [5 cm H20] 5 cm H20  TN SUP:  [10 cm H20] 10 cm H20  MAP (cm H2O):  [7.1-8.8] 7.4    I reviewed the patient's medications.    Assessment/Plan   ASSESSMENT/PLAN     Active Hospital Problems    Diagnosis    • **Status epilepticus (HCC)    • TBI (traumatic brain injury) (HCC)      Age 5     • Acute hypoxemic respiratory failure (HCC)    • Essential hypertension      Formatting of this note might be different from the original.  new onset - start lisinopril 10 mg qd.  recheck 3 mo.  check labs         21-year-old male with a history of traumatic brain injury, seizure disorder, hypertension, and retinal detachment.  He was brought to [facility] on 11/17 by EMS for persistent  seizures.  He required intubation for airway protection.  CT of the head was negative for any acute intracranial abnormalities.  He was observed to have left hemiparesis felt to be related to Chauncey's paralysis.  He had a fever while in the ER prompting initiation of antimicrobial therapy since the possibility of meningitis could not be properly assessed.    Remains on mechanical ventilation this morning.  No visible seizure activity.    Status epilepticus  · Seizures appear terminated with the use of Topamax and Trileptal  · Timing of follow-up EEG per neurology  Respiratory failure  · Wean ventilator and extubate  Fever spike yesterday  · Reassess for signs or symptoms of meningitis once he is extubated and discontinue antibiotics and steroids if possible    Discussed with patient's mother and Dr. Greer     I discussed the patient's findings and my recommendations with family, nursing staff and consulting provider     Plan of care and goals reviewed with multidisciplinary team at daily rounds.    High level of risk due to:  illness with threat to life or bodily function and abrupt change in neurological status.    Sidney Osorio MD  Pulmonary and Critical Care Medicine  11/18/21 14:53 EST

## 2021-11-18 NOTE — PROGRESS NOTES
"Neurology       Patient Care Team:  Provider, No Known as PCP - Joseph Lane MD as Consulting Physician (Neurology)    Chief complaint breakthrough seizure      Subjective .     History: No history obtainable from patient who is still intubated, mother at bedside notes he is very agitated.  Plan is for extubation this morning.  Nursing overnight reported thick brown respiratory secretions, sputum culture sent.    ROS: Not obtainable    Objective     Vital Signs   Blood pressure 121/70, pulse 96, temperature 98.8 °F (37.1 °C), temperature source Bladder, resp. rate 15, height 182.9 cm (72.01\"), weight 91.3 kg (201 lb 4.5 oz), SpO2 95 %.    Physical Exam:              Neuro: Well-developed young white man lying intubated in the ICU bed, markedly agitated but able to follow some verbal commands.  Briefly regards but also looks around the room.  Pupils 3.5 mm and reactive EOMI grimace symmetric  Motor: No weakness detected right upper extremity and moves right lower extremity well.  Spontaneous antigravity movement of left lower extremity, slightly less than right, LUE 4/5 after delay  No abnormal movement observed  Patient spontaneously flexes chin to chest as part of agitated movements.    Results Review:              Brain MRI with and without contrast images reviewed, agree no acute abnormality, no abnormal enhancement.  Left hippocampus smaller than right, with no abnormal signal, (as noted on previous MRI 2015)    EEG yesterday showed mild right hemisphere suppression and minor slowing consistent with focal dysfunction on the right but no epileptiform discharges.    Chest x-ray this morning with no acute cardiopulmonary findings    Labs reviewed  UA this morning with trace ketones and trace blood otherwise normal  BMP with glucose 103 creatinine 1.45 sodium 135 CO2 17 GFR 60 otherwise normal  CBC white count 15.3 H&H 12.6 and 37.1 platelets 282  Magnesium normal  Phosphorus elevated at 5.3  Lactate normal " at 0.7  Sputum culture preliminary last night shows moderate white cells few epithelial cells moderate gram-positive cocci in chains and clusters  Procalcitonin elevated 2.72    Assessment/Plan     22 yo man with intractable epilepsy, with prolonged breakthrough seizure with respiratory compromise, with no obvious cause for recurrent seizure (no illness, medication noncompliance, alcohol use or sleep deprivation).  Postictal left hemiparesis resolving.  EEG with no evidence of ongoing seizures (but very mild right hemisphere slowing and suppression).  Brain MRI with no acute abnormality.  Patient developed fever yesterday afternoon, antibiotics initiated to cover for meningitis.  However at this point, fevers highly likely due to aspiration, sputum cultures pending.  Will reassess after extubation today, but low suspicion for CNS infection, and will likely discontinue antibiotic coverage for this indication.  Continue Trileptal at home dose, topiramate at higher dose (may be able to decrease prior to discharge).    Addendum: Patient extubated.  Mildly agitated at this point, but speech fluent and clear, answers questions appropriately and knows he is in the hospital although not which one.  Denies any headache or neck stiffness.  No meningismus on exam.  Will discontinue ceftriaxone, vancomycin and dexamethasone started for possible meningitis given lack of evidence for same.    I discussed the patients findings and my recommendations with patient, family, nursing staff and primary care team    Erma Greer MD  11/18/21  10:52 EST

## 2021-11-18 NOTE — PAYOR COMM NOTE
"Juan Carlos Bui RN CM  Phone [phone]  Fax [phone]    Saturnino Henderson (21 y.o. Male)             Date of Birth Social Security Number Address Home Phone MRN    [id number]  [address] [place], [place] [address] [phone] [id number]    Taoist Marital Status             Sabianist        Admission Date Admission Type Admitting Provider Attending Provider Department, Room/Bed    11/17/21 Emergency Oj Cristobal MD Bansal, Arvind K, MD [facility] 2B ICU, N225/1    Discharge Date Discharge Disposition Discharge Destination                         Attending Provider: Oj Cristobal MD    Allergies: Bee Pollen, Bee Venom, Keppra [Levetiracetam]    Isolation: None   Infection: None   Code Status: CPR   Advance Care Planning Activity    Ht: 182.9 cm (72.01\")   Wt: 91.3 kg (201 lb 4.5 oz)    Admission Cmt: None   Principal Problem: Status epilepticus (HCC) [G40.901]                 Active Insurance as of 11/17/2021     Primary Coverage     Payor Plan Insurance Group Employer/Plan Group    [employer] [id number]     Payor Plan Address Payor Plan Phone Number Payor Plan Fax Number Effective Dates    [address] [phone]  11/17/2021 - None Entered    [facility] [id number]       Subscriber Name Subscriber Birth Date Member ID       RUTHIE HENDERSON [birth year]1960 [id number]                 Emergency Contacts      (Rel.) Home Phone Work Phone Mobile Phone    JUAN CARLOS HBATIA (Spouse) -- -- [phone]    SMITA HENDERSON (Father) -- -- [phone]               History & Physical      Franklin Vigil MD at 11/17/21 1049          Pulmonary/Critical Care History and Physical Exam     LOS: 0 days   Patient Care Team:  Provider, No Known as PCP - Joseph Lane MD as Consulting Physician (Neurology)    Chief Complaint:    Chief Complaint   Patient presents with   • Seizures     Subjective     HPI:   Saturnino Henderson is " "a 21 y.o. male, non-smoker, with a past medical history significant for traumatic brain injury, seizures, hypertension, and retinal detachment.  The patient presents to [facility] ED via EMS for seizure activity.  Information is obtained from the patient's wife as he is intubated and poorly responsive.  She reports that he had a seizure which lasted approximately 1 minute at 4 AM this morning.  He again had a seizure which lasted approximately 25 minutes at proximately 825 AM this morning at which time she called EMS.  On their arrival to the scene attempt was made to intubate the patient as his oxygen saturations were found to be 60-70%, and he was otherwise unable to protect his airway.  He continued to seize, and oxygen saturation remained low following intubation.  There was concern that intubation was unsuccessful.  On arrival to ED patient appeared to be postictal and was intubated successfully.  He did not appear to move his left side, was tumescent.  With a concern for other neurological issues the patient was taken emergently for CT scan further evaluation.  This did not show evidence of intracranial abnormality.  He was notably incontinent of urine with estimate of approximately 800 mL by nursing staff.  Significant labs: ABG pH 7.32/PCO2 30.2/PO2 139/bicarb 15.9/base deficit 8.8, glucose 236, sodium 127, creatinine 1.39, WBCs 14.18, BNP 77.9.    He is followed by neurology in [place] for his seizures.  It appears that the patient has taken Topamax, and Trileptal in the past. His wife reports that he has been vaccinated for Covid-19, and that the second vaccination \"caused a seizure\" which occurred later the day of vaccination.  Prior to presentation she reports that he had been in his normal state of health, and denied recent fever, chills, chest pain or shortness of air, nausea/vomiting/constipation/diarrhea.  He has however had worsening headaches/migraines recently.  She reports that his " seizure activity seems to worsen with stress, and sleep deprivation.    The patient developed agitation when he was taken for a head CT scan and was right side/thrashing about.  He was apparently left hemiparetic at that time.  Additional sedation was given.    On my evaluation, the patient's sedated and unresponsive.  He is on Versed and fentanyl drips.    History taken from: Patient, and chart    Past Medical History:   Diagnosis Date   • Seizures (HCC)    • TBI (traumatic brain injury) (HCC)     Age 5       History reviewed. No pertinent surgical history.    History reviewed. No pertinent family history.    Social History     Socioeconomic History   • Marital status:    Tobacco Use   • Smoking status: Never Smoker   • Smokeless tobacco: Never Used   Substance and Sexual Activity   • Alcohol use: Yes     Comment: every other week   • Drug use: Never       Allergies   Allergen Reactions   • Bee Pollen Anaphylaxis     Bee stings   • Bee Venom Swelling     Swelling of the face and the sting area      • Keppra [Levetiracetam] Other (See Comments) and Hallucinations     Father reports extreme hallucinations and aggressiveness with Keppra       PMH/FH/SocH were reviewed by me and updates were made.     Review of Systems:    Review of 14 systems was completed with positives and pertinent negatives noted in the subjective section.  All other systems reviewed and are negative.   Exceptions are noted below:    Unable to obtain secondary to unresponsiveness/mechanical ventilation.    Objective     Vital Signs  Temp:  [100.4 °F (38 °C)-101.8 °F (38.8 °C)] 101.5 °F (38.6 °C)  Heart Rate:  [] 90  Resp:  [24] 24  BP: ()/(48-73) 119/62  Body mass index is 28.48 kg/m².       Physical Exam:     General Appearance:   Sedated on ventilator, in no acute distress   Head:    Normocephalic, without obvious abnormality, atraumatic   Eyes:            Lids and lashes normal, conjunctivae and sclerae normal, no   icterus,  no pallor, corneas clear, PERRL   ENMT:   Ears appear intact with no abnormalities noted    Oral endotracheal tube in place   Neck:   No adenopathy, supple, trachea midline, no thyromegaly, no   carotid bruit, no JVD   Lungs/resp:    On ventilator, symmetric chest rise, no crepitus, clear to      auscultation bilaterally, no chest wall tenderness                  Heart/CV:    Regular rhythm and normal rate, normal S1 and S2, no         murmur   Abdomen/GI:     Normal bowel sounds, no masses, no organomegaly, soft,    nontender, nondistended   G/U:     Deferred   Extremities/MSK:   No clubbing, cyanosis or edema.  No deformities.    Pulses:   Pulses palpable and equal bilaterally   Skin:   No bleeding, bruising or rash   Hem/Lymph:   No cervical or supraclavicular adenopathy.    Neurologic:  Sedated.  No response to noxious stimulation.  No current cough reflex.  Positive doll's eyes.            Psychiatric: Sedated, nonagitated.      Results Review:     I reviewed the patient's new clinical results.   Results from last 7 days   Lab Units 11/17/21  0922   SODIUM mmol/L 127*   POTASSIUM mmol/L 3.9   CHLORIDE mmol/L 92*   CO2 mmol/L 13.0*   BUN mg/dL 13   CREATININE mg/dL 1.39*   CALCIUM mg/dL 9.2   BILIRUBIN mg/dL 0.2   ALK PHOS U/L 143*   ALT (SGPT) U/L 13   AST (SGOT) U/L 14   GLUCOSE mg/dL 236*     Results from last 7 days   Lab Units 11/17/21  0922   WBC 10*3/mm3 14.18*   HEMOGLOBIN g/dL 14.1   HEMATOCRIT % 40.5   PLATELETS 10*3/mm3 461*     Results from last 7 days   Lab Units 11/17/21  1246   PH, ARTERIAL pH units 7.389   PO2 ART mm Hg 122.0*   PCO2, ARTERIAL mm Hg 27.8*   HCO3 ART mmol/L 16.8*       I reviewed the patient's new imaging including images and reports.    CT head 11/17/2021:IMPRESSION:  No acute intracranial abnormality.    Chest x-ray 11/17/2021 shows endotracheal tube in place with no acute radiographic chest abnormality.    Medication Review:   chlorhexidine, 15 mL, Mouth/Throat,  Q12H  enoxaparin, 40 mg, Subcutaneous, Q24H  famotidine, 20 mg, Intravenous, BID  levETIRAcetam, 500 mg, Intravenous, Q12H  LORazepam, , ,   OXcarbazepine, 1,200 mg, Oral, Once  OXcarbazepine, 1,200 mg, Oral, Nightly  [START ON 11/18/2021] OXcarbazepine, 900 mg, Oral, Daily  sodium chloride, 10 mL, Intravenous, Q12H  topiramate, 200 mg, Oral, Q12H      fentanyl 10 mcg/mL,  mcg/hr, Last Rate: 100 mcg/hr (11/17/21 1442)  midazolam, 1-10 mg/hr, Last Rate: 4.5 mg/hr (11/17/21 1447)  sodium chloride, 250 mL/hr, Last Rate: 250 mL/hr (11/17/21 1324)        Assessment/Plan     Active Hospital Problems    Diagnosis    • **Status epilepticus (HCC)    • Acute hypoxemic respiratory failure (HCC)    • Essential hypertension      Formatting of this note might be different from the original.  new onset - start lisinopril 10 mg qd.  recheck 3 mo.  check labs       DURGA Castrejon, [facility]  Pulmonology and Critical Care Medicine  Electronically signed by DURGA Tubbs, 11/17/21, 10:49 AM EST.    I performed an independent history and physical examination. Portions of the history were obtained by DURGA and were modified by me according to my findings. The above note reflects my findings, assessment, and plan.    Franklin Vigil MD    21 y.o. male non-smoker with history of traumatic brain injury, seizure disorder, hypertension, retinal detachment who was brought to [facility] emergency department by EMS for persistent seizures.  He apparently had had a seizure for 1 minute at 4 AM the morning of admission.  At around 8:25 AM, he had a 25-minute seizure and his wife called EMS.  Oxygen saturations on EMS arrival were reportedly 60 to 70%.  He was felt to be unable to protect his airway so he was apparently intubated by EMS.  His oxygen saturations did not significantly improve so he was subsequently extubated.  He was reintubated on arrival to the emergency department with adequate oxygen  "saturations.    Initial CT of the head showed no significant abnormality.  Patient became agitated and was thrashing about on the right however had left hemiparesis.  EEG was done and reportedly shows no active seizures.    Oxygen saturations are currently adequate on mechanical ventilation.  He has been seen by neurology who has started him on Topamax and Trileptal.  He received Keppra initially.  His father reported that he has had combativeness with Keppra in the past.    The patient will be admitted to the intensive care unit for ongoing management of hypoxemic respiratory failure and status epilepticus.  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  No recent infectious symptoms.    Plan:  1.  For seizure/status epilepticus: No active seizing on EEG.  Neurology following.  Trileptal and Topamax.  2.  For acute hypoxemic respiratory failure: Wean sedation as tolerated.  If seizures remain controlled could attempt ventilator weaning.  3.  For hypertension: Monitor with antihypertensives as necessary.  4.  DVT prophylaxis: Lovenox.    The patient is critically ill secondary to acute hypoxemic respiratory failure in the setting of status epilepticus and has high risk of life-threatening decline in condition.  As such, he requires continuous monitoring frequent reassessment for consideration of adjustment in management to minimize this risk.  I personally reassessed him multiple times today.    Critical care time : 45 minutes spent by me personally.(This excludes time spent performing separately reportable procedures and services). including high complexity decision making to assess, manipulate, and support vital organ system failure in this individual who has impairment of one or more vital organ systems such that there is a high probability of imminent or life threatening deterioration in the patient’s condition.    Electronically signed by: Franklin Vigil MD, 11/17/21, 3:29 PM " EST.      • Please note that portions of this note were completed with NICO - a voice recognition program.     Addendum: Patient has developed fevers to 101.8 Fahrenheit.  No obvious source of infection.  This could be secondary to seizure activity however I will cover empirically for meningitis.  Discussed with neurology who will obtain an MRI.  Also sent blood cultures x2, procalcitonin, lactic acid level.    Electronically signed by: Franklin Vigil MD, 11/17/21, 3:57 PM EST.    Electronically signed by Franklin Vigil MD at 11/17/21 1557          Emergency Department Notes      Tyler Gerardo MD at 11/17/21 0922      Procedure Orders    1. Critical Care [[id number]] ordered by Tyler Gerardo MD    2. Intubation [[id number]] ordered by Tyler Gerardo MD               Subjective   This patient is a 21-year-old gentleman.  No history is available.  EMS brought the patient in with a history of seizure.  Initial EMS report indicated that they attempted to intubate the patient.  Oxygen saturations were in the 60 or 70% range when initial intubation took place.  Evidently, oxygen saturations never came up and there was appropriate concerned that intubation was not successful and the tube was therefore pulled the patient came in not intubated but unresponsive.  Patient evidently received Versed in route.  Just prior to arrival was still seizing.  According to EMS report, patient had been seizing for approximately 1 hour.  Onset of seizure activity was approximately 825 this morning.  Patient has a history of traumatic brain injury and was evidently at the home of his girlfriends when this occurred.  Nobody present here in the emergency department including EMS team is aware of the patient's baseline mental status or medications.  Documentation here indicates that the patient may be on or in the past has taken both Topamax and Trileptal.  In summary, we have a 21-year-old gentleman brought  in via EMS with initial call for seizure with unsuccessful intubation, hypoxia, and concern for airway protection who comes in for evaluation.  Patient is unable to provide history.    Past medical history  Seizure disorder on Trileptal and Topamax.  Traumatic brain injury as a child.    Family history  Unable to obtain secondary to patient's critical status and noncommunicative state          Review of Systems   Unable to perform ROS: Acuity of condition   Neurological: Positive for seizures.       Past Medical History:   Diagnosis Date   • Seizures ([facility])    • TBI (traumatic brain injury) ([facility])     Age 5       Allergies   Allergen Reactions   • Bee Pollen Anaphylaxis     Bee stings   • Bee Venom Swelling     Swelling of the face and the sting area      • Keppra [Levetiracetam] Other (See Comments) and Hallucinations     Father reports extreme hallucinations and aggressiveness with Keppra         History reviewed. No pertinent surgical history.    History reviewed. No pertinent family history.    Social History     Socioeconomic History   • Marital status:    Tobacco Use   • Smoking status: Never Smoker   • Smokeless tobacco: Never Used   Substance and Sexual Activity   • Alcohol use: Yes     Comment: every other week   • Drug use: Never           Objective   Physical Exam  Vitals and nursing note reviewed.   Constitutional:       General: He is in acute distress.      Appearance: He is ill-appearing and diaphoretic. He is not toxic-appearing.   HENT:      Head: Normocephalic.      Right Ear: External ear normal.      Left Ear: External ear normal.      Nose: Nose normal.      Mouth/Throat:      Mouth: Mucous membranes are dry.      Pharynx: Oropharynx is clear.      Comments: Dried blood noted around the tongue and lips.  No signs of direct trauma to the tongue.  No active bleeding within the oropharynx or mouth.  Eyes:      General:         Right eye: No discharge.         Left eye: No discharge.       Conjunctiva/sclera: Conjunctivae normal.   Cardiovascular:      Rate and Rhythm: Regular rhythm. Tachycardia present.      Pulses: Normal pulses.   Pulmonary:      Breath sounds: Wheezing and rhonchi present.      Comments: Patient breathing transiently on his own, positive rhonchi and wheezing.  No crepitance on the chest wall.  Abdominal:      General: Abdomen is flat. There is no distension.      Palpations: Abdomen is soft.   Musculoskeletal:         General: No deformity or signs of injury.      Cervical back: No rigidity.      Right lower leg: No edema.      Left lower leg: No edema.   Lymphadenopathy:      Cervical: No cervical adenopathy.   Skin:     General: Skin is warm.      Capillary Refill: Capillary refill takes 2 to 3 seconds.      Findings: No bruising, lesion or rash.   Neurological:      Comments: Patient unresponsive.  No active seizure activity noted.  No withdrawal to pain.  Pupils 7 mm and reactive minimally to the light bilaterally.  No ability to follow commands.  Breathing transiently on his own.         Intubation    Date/Time: 11/17/2021 10:08 AM  Performed by: Tyler Gerardo MD  Authorized by: Tyler Gerardo MD     Consent:     Consent obtained:  Emergent situation    Consent given by: Patient unresponsive and no family at bedside emergent situation/life-saving intervention.    Alternatives discussed:  Alternative treatment  Pre-procedure details:     Patient status:  Unresponsive    Mallampati score:  II    Pretreatment medications:  None    Paralytics:  None  Procedure details:     Preoxygenation:  Bag valve mask    CPR in progress: no      Intubation method:  Oral    Oral intubation technique:  Direct    Laryngoscope blade:  Mac 4    Tube size (mm):  7.5    Tube type:  Cuffed    Number of attempts:  1    Ventilation between attempts: no      Cricoid pressure: no      Tube visualized through cords: yes    Placement assessment:     ETT to lip:  24    ETT to teeth:  23    Tube  secured with:  ETT lucio    Breath sounds:  Equal    Placement verification: chest rise, condensation, CXR verification, direct visualization, equal breath sounds, ETCO2 detector and tube exhalation      CXR findings:  ETT in proper place  Post-procedure details:     Patient tolerance of procedure:  Tolerated well, no immediate complications    Critical Care  Performed by: Tyler Gerardo MD  Authorized by: Tyler Gerardo MD     Critical care provider statement:     Critical care time (minutes):  45    Critical care start time:  11/17/2021 9:27 AM    Critical care end time:  11/17/2021 10:12 AM    Critical care was necessary to treat or prevent imminent or life-threatening deterioration of the following conditions:  CNS failure or compromise    Critical care was time spent personally by me on the following activities:  Discussions with consultants, evaluation of patient's response to treatment, examination of patient, ventilator management, review of old charts, re-evaluation of patient's condition, pulse oximetry, ordering and review of radiographic studies, ordering and review of laboratory studies and ordering and performing treatments and interventions    I assumed direction of critical care for this patient from another provider in my specialty: no                ED Course  ED Course as of 11/17/21 1548   Wed Nov 17, 2021   0924 Patient unresponsive at this time but does not appear to be actively seizing.  I was able to quickly review past medical documentation.  It appears that the patient has been managed most recently out of town at [facility].  He has both a primary care physician and a neurologist within that healthcare system.  Patient was emergently intubated here secondary to respiratory compromise and low oxygen saturations and in order to protect the airway.  Patient was easily intubated on first pass with direct visualization of the cords, tube passing through the cords,  bilateral breath sounds, and color change capnography.  Post intubation chest x-ray pending.  Oxygen saturations 97% on the ventilator.  No complications.  I have ordered a CT of the head and basic labs.  I have loaded the patient with Keppra.  I have noted in the chart that the patient appears to be on Trileptal.  I do anticipate admission here for further care including potential consultation with neurology.  We will certainly get a hold of any family available to discuss course of care with him.  Critical care time will be noted downstream. [RACHELLE]   0949 Patient's IV has been dislodged and required replacement by nursing staff.  Versed drip has been ordered for sedation/control.  Planning to get the patient over to CT soon.  Patient moving extremities unilaterally only. [RACHELLE]   0958 I have reexamined the patient twice personally for total 3 evaluations.  Patient is withdrawing now to pain on the right side but not moving the left side with response to painful stimuli.  New IV has been placed and Keppra has been given.  I have paged the intensivist, Dr. Franklin Vigil for admission will discuss sedation agents with him to determine if he would like to move to Precedex or something similar from Versed.  Patient is going emergently over to the CT scanner.  Patient will be admitted to Dr. Vigil for further care. [RACHELLE]   0959 Patient is overbreathing the vent with a respiratory rate of 33. [RACHELLE]   0959   ABG on the vent showed a pH of 7.32.  CMP shows the patient to be hyponatremic at 127 with a creatinine of 1.39.  IV fluid rehydration will start when he gets back.  Troponin and BNP normal.  I reviewed the chest x-ray dependently.  Endotracheal tube appears to be well-placed several centimeters above the robert.  Awaiting official read by Dr. Beatty. [RACHELLE]   1004 Dr. Greer (Neurology) page in consultation.   [RACHELLE]   1005 I discussed the case personally with Dr. Vigil at 10:03am ET.  We discussed the presentation,  all labs resulted to date, neurologic findings (Right sided movement only) and past medical history, including notes I have reviewed from [facility] and history of seizure disorder.  Dr. Vigil like to maintain the Versed drip and indicated he may add something for sedation control after evaluating the patient down here.  Neurology has been paged in consultation we will have him follow along with the intensivist.  No family here at the bedside.  Patient is now over a scan and I plan to go over to review images as they come off in real-time. [RACHELLE]   1007 Critical care time on this patient including management of his airway compromise, and acute seizure activity, 45 minutes.  Procedure note for critical care time and intubation placed in the chart. [RACHELLE]   1012 I discussed the case with Dr. Cadena.  She agreed with the plan of management.  She will evaluate the patient personally in consultation.  Chest x-ray has been completed.  Endotracheal tube is in good position.  CT scan imaging not available as of yet. [RACHELLE]   1019 I discussed the CT head personally with Dr. Tiffani Beatty.  We reviewed the images together.  No evidence of acute bleed or abnormality on CT of the head.  Patient will be admitted to the ICU for further care.  Critical care time of 45 minutes has been noted as well as procedure note regarding intubation. [RACHELLE]      ED Course User Index  [RACHELLE] Tyler Gerardo MD     Recent Results (from the past 24 hour(s))   Comprehensive Metabolic Panel    Collection Time: 11/17/21  9:22 AM    Specimen: Blood   Result Value Ref Range    Glucose 236 (H) 65 - 99 mg/dL    BUN 13 6 - 20 mg/dL    Creatinine 1.39 (H) 0.76 - 1.27 mg/dL    Sodium 127 (L) 136 - 145 mmol/L    Potassium 3.9 3.5 - 5.2 mmol/L    Chloride 92 (L) 98 - 107 mmol/L    CO2 13.0 (L) 22.0 - 29.0 mmol/L    Calcium 9.2 8.6 - 10.5 mg/dL    Total Protein 7.4 6.0 - 8.5 g/dL    Albumin 5.00 3.50 - 5.20 g/dL    ALT (SGPT) 13 1 - 41 U/L    AST  (SGOT) 14 1 - 40 U/L    Alkaline Phosphatase 143 (H) 39 - 117 U/L    Total Bilirubin 0.2 0.0 - 1.2 mg/dL    eGFR Non African Amer 63 >60 mL/min/1.73    Globulin 2.4 gm/dL    A/G Ratio 2.1 g/dL    BUN/Creatinine Ratio 9.4 7.0 - 25.0    Anion Gap 22.0 (H) 5.0 - 15.0 mmol/L   BNP    Collection Time: 11/17/21  9:22 AM    Specimen: Blood   Result Value Ref Range    proBNP 77.9 0.0 - 450.0 pg/mL   Troponin    Collection Time: 11/17/21  9:22 AM    Specimen: Blood   Result Value Ref Range    Troponin T <0.010 0.000 - 0.030 ng/mL   Green Top (Gel)    Collection Time: 11/17/21  9:22 AM   Result Value Ref Range    Extra Tube Hold for add-ons.    Lavender Top    Collection Time: 11/17/21  9:22 AM   Result Value Ref Range    Extra Tube hold for add-on    Gold Top - SST    Collection Time: 11/17/21  9:22 AM   Result Value Ref Range    Extra Tube Hold for add-ons.    Gray Top    Collection Time: 11/17/21  9:22 AM   Result Value Ref Range    Extra Tube Hold for add-ons.    Light Blue Top    Collection Time: 11/17/21  9:22 AM   Result Value Ref Range    Extra Tube hold for add-on    CBC Auto Differential    Collection Time: 11/17/21  9:22 AM    Specimen: Blood   Result Value Ref Range    WBC 14.18 (H) 3.40 - 10.80 10*3/mm3    RBC 4.68 4.14 - 5.80 10*6/mm3    Hemoglobin 14.1 13.0 - 17.7 g/dL    Hematocrit 40.5 37.5 - 51.0 %    MCV 86.5 79.0 - 97.0 fL    MCH 30.1 26.6 - 33.0 pg    MCHC 34.8 31.5 - 35.7 g/dL    RDW 11.9 (L) 12.3 - 15.4 %    RDW-SD 37.5 37.0 - 54.0 fl    MPV 9.7 6.0 - 12.0 fL    Platelets 461 (H) 140 - 450 10*3/mm3    Neutrophil % 75.8 42.7 - 76.0 %    Lymphocyte % 18.0 (L) 19.6 - 45.3 %    Monocyte % 4.3 (L) 5.0 - 12.0 %    Eosinophil % 0.1 (L) 0.3 - 6.2 %    Basophil % 0.1 0.0 - 1.5 %    Immature Grans % 1.7 (H) 0.0 - 0.5 %    Neutrophils, Absolute 10.74 (H) 1.70 - 7.00 10*3/mm3    Lymphocytes, Absolute 2.55 0.70 - 3.10 10*3/mm3    Monocytes, Absolute 0.61 0.10 - 0.90 10*3/mm3    Eosinophils, Absolute 0.02 0.00 - 0.40  10*3/mm3    Basophils, Absolute 0.02 0.00 - 0.20 10*3/mm3    Immature Grans, Absolute 0.24 (H) 0.00 - 0.05 10*3/mm3    nRBC 0.0 0.0 - 0.2 /100 WBC   Urine Drug Screen - Urine, Clean Catch    Collection Time: 11/17/21  9:44 AM    Specimen: Urine, Clean Catch   Result Value Ref Range    THC, Screen, Urine Negative Negative    Phencyclidine (PCP), Urine Negative Negative    Cocaine Screen, Urine Negative Negative    Methamphetamine, Ur Negative Negative    Opiate Screen Negative Negative    Amphetamine Screen, Urine Negative Negative    Benzodiazepine Screen, Urine Negative Negative    Tricyclic Antidepressants Screen Negative Negative    Methadone Screen, Urine Negative Negative    Barbiturates Screen, Urine Negative Negative    Oxycodone Screen, Urine Negative Negative    Propoxyphene Screen Negative Negative    Buprenorphine, Screen, Urine Negative Negative   Blood Gas, Arterial With Co-Ox    Collection Time: 11/17/21  9:51 AM    Specimen: Arterial Blood   Result Value Ref Range    Site Right Radial     Isra's Test N/A     pH, Arterial 7.322 (L) 7.350 - 7.450 pH units    pCO2, Arterial 30.8 (L) 35.0 - 45.0 mm Hg    pO2, Arterial 139.0 (H) 83.0 - 108.0 mm Hg    HCO3, Arterial 15.9 (L) 20.0 - 26.0 mmol/L    Base Excess, Arterial -8.8 (L) 0.0 - 2.0 mmol/L    Hemoglobin, Blood Gas 14.6 13.5 - 17.5 g/dL    Hematocrit, Blood Gas 44.7 38.0 - 51.0 %    Oxyhemoglobin 97.9 94 - 99 %    Methemoglobin 0.70 0.00 - 1.50 %    Carboxyhemoglobin 0.4 0 - 2 %    CO2 Content 16.9 (L) 22 - 33 mmol/L    Temperature 37.0 C    Barometric Pressure for Blood Gas      Modality Ventilator     FIO2 50 %    Rate 0 Breaths/minute    PIP 0 cmH2O    IPAP 0     EPAP 0     Note      pH, Temp Corrected 7.322 pH Units    pCO2, Temperature Corrected 30.8 (L) 35 - 48 mm Hg    pO2, Temperature Corrected 139 (H) 83 - 108 mm Hg   ECG 12 Lead    Collection Time: 11/17/21  9:52 AM   Result Value Ref Range    QT Interval 318 ms    QTC Interval 453 ms    COVID-19 and FLU A/B PCR - Swab, Nasopharynx    Collection Time: 11/17/21 10:25 AM    Specimen: Nasopharynx; Swab   Result Value Ref Range    COVID19 Not Detected Not Detected - Ref. Range    Influenza A PCR Not Detected Not Detected    Influenza B PCR Not Detected Not Detected   Blood Gas, Arterial With Co-Ox    Collection Time: 11/17/21 12:46 PM    Specimen: Arterial Blood   Result Value Ref Range    Site Left Radial     Isra's Test N/A     pH, Arterial 7.389 7.350 - 7.450 pH units    pCO2, Arterial 27.8 (L) 35.0 - 45.0 mm Hg    pO2, Arterial 122.0 (H) 83.0 - 108.0 mm Hg    HCO3, Arterial 16.8 (L) 20.0 - 26.0 mmol/L    Base Excess, Arterial -6.7 (L) 0.0 - 2.0 mmol/L    Hemoglobin, Blood Gas 14.1 13.5 - 17.5 g/dL    Hematocrit, Blood Gas 43.3 38.0 - 51.0 %    Oxyhemoglobin 97.9 94 - 99 %    Methemoglobin 0.80 0.00 - 1.50 %    Carboxyhemoglobin 0.3 0 - 2 %    CO2 Content 17.6 (L) 22 - 33 mmol/L    Temperature 37.0 C    Barometric Pressure for Blood Gas      Modality Ventilator     FIO2 35 %    Ventilator Mode       Note      pH, Temp Corrected 7.389 pH Units    pCO2, Temperature Corrected 27.8 (L) 35 - 48 mm Hg    pO2, Temperature Corrected 122 (H) 83 - 108 mm Hg     Note: In addition to lab results from this visit, the labs listed above may include labs taken at another facility or during a different encounter within the last 24 hours. Please correlate lab times with ED admission and discharge times for further clarification of the services performed during this visit.    XR Abdomen KUB   Preliminary Result   There Is a gastric tube identified tip in the stomach. The   bowel gas pattern is nonspecific.              CT Head Without Contrast   Preliminary Result   No acute intracranial abnormality.       NOTE: Examination was performed 11/17/2021 at 10:08 AM.       D:  11/17/2021   E:  11/17/2021              XR Chest 1 View   Preliminary Result   No acute cardiopulmonary disease.       D:  11/17/2021   E:   11/17/2021                  XR Chest 1 View    (Results Pending)   XR Chest 1 View    (Results Pending)     Vitals:    11/17/21 1511 11/17/21 1517 11/17/21 1530 11/17/21 1540   BP:   129/73 134/71   BP Location:       Patient Position:       Pulse:  88 97 89   Resp:       Temp: (!) 101.1 °F (38.4 °C)      TempSrc: Bladder      SpO2:  100% 99% 99%   Weight:       Height:         Medications   sodium chloride 0.9 % flush 10 mL (has no administration in time range)   LORazepam (ATIVAN) 2 MG/ML injection  - ADS Override Pull (  Not Given 11/17/21 1027)   sodium chloride 0.9 % infusion (250 mL/hr Intravenous New Bag 11/17/21 1511)   fentaNYL 2500 mcg/250 mL NS infusion (50 mcg/hr Intravenous Rate Change (DUAL SIGN) 11/17/21 1542)   sodium chloride 0.9 % flush 10 mL (10 mL Intravenous Given 11/17/21 1335)   sodium chloride 0.9 % flush 10 mL (has no administration in time range)   chlorhexidine (PERIDEX) 0.12 % solution 15 mL (has no administration in time range)   enoxaparin (LOVENOX) syringe 40 mg (40 mg Subcutaneous Given 11/17/21 1323)   famotidine (PEPCID) injection 20 mg (20 mg Intravenous Given 11/17/21 1323)   acetaminophen (TYLENOL) tablet 650 mg ( Oral Not Given:  See Alt 11/17/21 1315)     Or   acetaminophen (TYLENOL) suppository 650 mg (650 mg Rectal Given 11/17/21 1315)   albuterol (PROVENTIL) nebulizer solution 0.083% 2.5 mg/3mL (has no administration in time range)   ondansetron (ZOFRAN) injection 4 mg (has no administration in time range)   levETIRAcetam in NaCl 0.82% (KEPPRA) IVPB 500 mg (0 mg Intravenous Hold 11/17/21 1353)   midazolam (VERSED) 100 mg in 100mL NS infusion (2.5 mg/hr Intravenous Rate/Dose Change 11/17/21 1540)   OXcarbazepine (TRILEPTAL) tablet 1,200 mg (has no administration in time range)   OXcarbazepine (TRILEPTAL) tablet 900 mg (has no administration in time range)   OXcarbazepine (TRILEPTAL) tablet 1,200 mg (has no administration in time range)   topiramate (TOPAMAX) tablet 200 mg  (has no administration in time range)   levETIRAcetam in NaCl 0.75% (KEPPRA) IVPB 1,000 mg (0 mg Intravenous Stopped 11/17/21 1324)   levETIRAcetam in NaCl 0.75% (KEPPRA) IVPB 1,000 mg (0 mg Intravenous Stopped 11/17/21 1032)     ECG/EMG Results (last 24 hours)     Procedure Component Value Units Date/Time    ECG 12 Lead [[id number]] Collected: 11/17/21 0952     Updated: 11/17/21 1234     QT Interval 318 ms      QTC Interval 453 ms     Narrative:      Test Reason : SOA Protocol  Blood Pressure :   */*   mmHG  Vent. Rate : 122 BPM     Atrial Rate : 128 BPM     P-R Int : 154 ms          QRS Dur : 106 ms      QT Int : 318 ms       P-R-T Axes :  56  25 -42 degrees     QTc Int : 453 ms    Sinus tachycardia  Inferior infarct , age undetermined  Abnormal ECG  No previous ECGs available  Confirmed by PATRICK MAZARIEGOS MD [id number]) on 11/17/2021 12:33:47 PM    Referred By: YAIR           Confirmed By: PATRICK MAZARIEGOS MD        ECG 12 Lead   Final Result   Test Reason : SOA Protocol   Blood Pressure :   */*   mmHG   Vent. Rate : 122 BPM     Atrial Rate : 128 BPM      P-R Int : 154 ms          QRS Dur : 106 ms       QT Int : 318 ms       P-R-T Axes :  56  25 -42 degrees      QTc Int : 453 ms      Sinus tachycardia   Inferior infarct , age undetermined   Abnormal ECG   No previous ECGs available   Confirmed by PATRICK MAZARIEGOS MD ([id number]) on 11/17/2021 12:33:47 PM      Referred By: YAIR           Confirmed By: PATRICK MAZARIEGOS MD                                                [facility]    Final diagnoses:   Seizure (HCC)   Status epilepticus (HCC)   Acute respiratory failure with hypoxia (HCC)   Hyponatremia   Leukocytosis, unspecified type       ED Disposition  ED Disposition     ED Disposition Condition Comment    Decision to Admit  Level of Care: Critical Care [6]   Admitting Physician: IRAIS MORALES [[id number]]   Attending Physician: IRAIS MORALES [[id number]]   Patient Class: Inpatient [101]            No follow-up provider  specified.       Medication List      No changes were made to your prescriptions during this visit.          Tyler Gerardo MD  11/17/21 1548      Electronically signed by Tyler Gerardo MD at 11/17/21 1548         Facility-Administered Medications as of 11/18/2021   Medication Dose Route Frequency Provider Last Rate Last Admin   • [START ON 11/19/2021] !Vancomycin level 11/19 @ 0600. Plese hold 0800 dose until pharmacy evaluates    Does not apply Once Stacy Schrader [facility]       • acetaminophen (TYLENOL) tablet 650 mg  650 mg Oral Q4H PRN Jeanie Frank APRN        Or   • acetaminophen (TYLENOL) suppository 650 mg  650 mg Rectal Q4H PRN Jeanie Frank APRN   650 mg at 11/17/21 1315   • albuterol (PROVENTIL) nebulizer solution 0.083% 2.5 mg/3mL  2.5 mg Nebulization Q6H PRN Jeanie Frank APRN       • cefTRIAXone (ROCEPHIN) 2 g/100 mL 0.9% NS IVPB (MBP)  2 g Intravenous Q12H Franklin Vigil MD 0 mL/hr at 11/17/21 1907 2 g at 11/18/21 0527   • chlorhexidine (PERIDEX) 0.12 % solution 15 mL  15 mL Mouth/Throat Q12H Jeanie Frank APRN   15 mL at 11/17/21 2235   • dexamethasone (DECADRON) injection 4 mg  4 mg Intravenous Q6H Franklin Vigil MD   4 mg at 11/18/21 1139   • dexmedetomidine (PRECEDEX) 400 mcg in 100 mL NS infusion  0.2-1.5 mcg/kg/hr Intravenous Titrated Franklin Vigil MD   Stopped at 11/18/21 0134   • enoxaparin (LOVENOX) syringe 40 mg  40 mg Subcutaneous Q24H Jeanie Frank APRN   40 mg at 11/18/21 1139   • famotidine (PEPCID) injection 20 mg  20 mg Intravenous BID Jeanie Frank APRN   20 mg at 11/18/21 0856   • fentaNYL 2500 mcg/250 mL NS infusion   mcg/hr Intravenous Titrated Jeanie Frank APRN   Stopped at 11/18/21 1030   • [COMPLETED] gadobenate dimeglumine (MULTIHANCE) injection 19 mL  19 mL Intravenous Once in imaging Oj Cristobal MD   19 mL at 11/18/21 0012   • [COMPLETED] levETIRAcetam in NaCl 0.75% (KEPPRA) IVPB 1,000  mg  1,000 mg Intravenous Once Tyler Gerardo MD   Stopped at 21 1324   • [COMPLETED] levETIRAcetam in NaCl 0.75% (KEPPRA) IVPB 1,000 mg  1,000 mg Intravenous Once Tyler Gerardo MD   Stopped at 21 1032   • [] LORazepam (ATIVAN) 2 MG/ML injection  - ADS Override Pull            • midazolam (VERSED) 100 mg in 100mL NS infusion  1-10 mg/hr Intravenous Titrated Jeanie Frank APRN   Stopped at 21 2038   • ondansetron (ZOFRAN) injection 4 mg  4 mg Intravenous Q6H PRN Jeanie Frank APRN       • [COMPLETED] OXcarbazepine (TRILEPTAL) tablet 1,200 mg  1,200 mg Oral Once Erma Greer MD   1,200 mg at 21 1709   • OXcarbazepine (TRILEPTAL) tablet 1,200 mg  1,200 mg Oral Nightly Erma Greer MD   1,200 mg at 21 0125   • OXcarbazepine (TRILEPTAL) tablet 900 mg  900 mg Oral Daily Erma Greer MD   900 mg at 21 0855   • Pharmacy to dose vancomycin   Does not apply Continuous PRN Franklin Vigil MD       • propofol (DIPRIVAN) infusion 10 mg/mL 100 mL  5-50 mcg/kg/min Intravenous Titrated Nataly Bruno APRN   Stopped at 21 1030   • [COMPLETED] Propofol (DIPRIVAN) injection 70 mg  70 mg Intravenous Once Deshawn Sparrow MD   70 mg at 21 2146   • sodium chloride 0.9 % flush 10 mL  10 mL Intravenous PRN Jeanie Frank APRN       • sodium chloride 0.9 % flush 10 mL  10 mL Intravenous Q12H Jeanie Frank APRN   10 mL at 21 0856   • sodium chloride 0.9 % flush 10 mL  10 mL Intravenous PRN Jeanie Frank APRN       • sodium chloride 0.9 % infusion  100 mL/hr Intravenous Continuous Sidney Osorio  mL/hr at 21 0812 100 mL/hr at 21 0812   • topiramate (TOPAMAX) tablet 200 mg  200 mg Oral Q12H Erma Greer MD   200 mg at 21 0856   • vancomycin 1250 mg/250 mL 0.9% NS IVPB ([facility])  1,250 mg Intravenous Q12H Stacy Schrader GHADA   1,250 mg at 21 0858  "  • [COMPLETED] vancomycin 2000 mg/500 mL 0.9% NS IVPB ([facility])  20 mg/kg Intravenous Once Stacy Schrader, RPH   2,000 mg at 11/17/21 1932        Physician Progress Notes (last 48 hours)      Erma Greer MD at 11/18/21 1052        Neurology       Patient Care Team:  Provider, No Known as PCP - Joseph Lane MD as Consulting Physician (Neurology)    Chief complaint breakthrough seizure      Subjective .     History: No history obtainable from patient who is still intubated, mother at bedside notes he is very agitated.  Plan is for extubation this morning.  Nursing overnight reported thick brown respiratory secretions, sputum culture sent.    ROS: Not obtainable    Objective     Vital Signs   Blood pressure 121/70, pulse 96, temperature 98.8 °F (37.1 °C), temperature source Bladder, resp. rate 15, height 182.9 cm (72.01\"), weight 91.3 kg (201 lb 4.5 oz), SpO2 95 %.    Physical Exam:              Neuro: Well-developed young white man lying intubated in the ICU bed, markedly agitated but able to follow some verbal commands.  Briefly regards but also looks around the room.  Pupils 3.5 mm and reactive EOMI grimace symmetric  Motor: No weakness detected right upper extremity and moves right lower extremity well.  Spontaneous antigravity movement of left lower extremity, slightly less than right, LUE 4/5 after delay  No abnormal movement observed  Patient spontaneously flexes chin to chest as part of agitated movements.    Results Review:              Brain MRI with and without contrast images reviewed, agree no acute abnormality, no abnormal enhancement.  Left hippocampus smaller than right, with no abnormal signal, (as noted on previous MRI 2015)    EEG yesterday showed mild right hemisphere suppression and minor slowing consistent with focal dysfunction on the right but no epileptiform discharges.    Chest x-ray this morning with no acute cardiopulmonary findings    Labs reviewed  UA this morning with " trace ketones and trace blood otherwise normal  BMP with glucose 103 creatinine 1.45 sodium 135 CO2 17 GFR 60 otherwise normal  CBC white count 15.3 H&H 12.6 and 37.1 platelets 282  Magnesium normal  Phosphorus elevated at 5.3  Lactate normal at 0.7  Sputum culture preliminary last night shows moderate white cells few epithelial cells moderate gram-positive cocci in chains and clusters  Procalcitonin elevated 2.72    Assessment/Plan     22 yo man with intractable epilepsy, with prolonged breakthrough seizure with respiratory compromise, with no obvious cause for recurrent seizure (no illness, medication noncompliance, alcohol use or sleep deprivation).  Postictal left hemiparesis resolving.  EEG with no evidence of ongoing seizures (but very mild right hemisphere slowing and suppression).  Brain MRI with no acute abnormality.  Patient developed fever yesterday afternoon, antibiotics initiated to cover for meningitis.  However at this point, fevers highly likely due to aspiration, sputum cultures pending.  Will reassess after extubation today, but low suspicion for CNS infection, and will likely discontinue antibiotic coverage for this indication.  Continue Trileptal at home dose, topiramate at higher dose (may be able to decrease prior to discharge).    I discussed the patients findings and my recommendations with patient, family, nursing staff and primary care team    Erma Greer MD  11/18/21  10:52 EST      Electronically signed by Erma Greer MD at 11/18/21 1115          Consult Notes (last 48 hours)      Erma Greer MD at 11/17/21 1206      Consult Orders    1. Inpatient Neurology Consult General [[id number]] ordered by Jeanie Frank APRN at 11/17/21 1028                   Referring Provider: Dr Vigil  Reason for Consultation: seizure    Patient Care Team:  Provider, No Known as PCP - General  Joseph Schmitt MD as Consulting Physician (Neurology)    Chief complaint  "seizure    Subjective .     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  His wife reports no missed doses of meds, no alcohol, no sleep deprivation or illness or other obvious precipitant. First brief seizure at 4am, then at 8:30 prolonged seizure with typical onset, pt made typical sound, looked around room, then flexion RUE, upward extension LUE (fencing posture), but did not becky and called EMS.  Had urinary incontinence.  Attempted intubation in field 2/2 low O2 sats, reintubated in ED with no sedation or paralytic required. As pt roused, noted to have no mvmt of left side and taken for stat HCT. Received ativan 1mg and Keppra 1g (very poorly tolerated in past per father).  No history obtainable from patient.  Some recent stress,  2 weeks ago.  Last seizure was in July while at work and after having a couple bourbons, and April prior to that.  Does not drink a lot per his wife, although some seizures in the past associated with having 1 or 2 alcoholic drinks but no alcohol yesterday.  No reported fevers or headache.  No recent changes in meds.  Excellent compliance per patient's wife.  Gets aura of \"weird feeling\" prior to seizures.  Much of his epilepsy treatment in the past has been through [facility] per his father, more recently at [facility], but has appointment at  epilepsy center with Dr. Shaunna cordova for January 2022.  Patient has been intubated with seizures in the past per his father.  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  Brain MRI without gadolinium at [facility] 2/15 showed slightly smaller left hippocampus with normal signal  Routine EEG 6/13 normal, 72-hour inpatient video EEG 2/15 at [facility] showed 1 spell of weird feeling followed by gagging and vomiting without EEG change.  " "Severe generalized slowing and excessive beta also noted.  EMU June 2020-patient tapered off med and had no spells, EEG normal.    Review of Systems  Review of systems could not be obtained due to   patient sedation status. patient intubated.     History  Past Medical History:   Diagnosis Date   • Seizures (HCC)    • TBI (traumatic brain injury) (HCC)     Age 5   , History reviewed. No pertinent surgical history., History reviewed. No pertinent family history.,   Social History     Socioeconomic History   • Marital status:    Tobacco Use   • Smoking status: Never Smoker   • Smokeless tobacco: Never Used   Substance and Sexual Activity   • Alcohol use: Yes     Comment: every other week   • Drug use: Never     E-cigarette/Vaping     E-cigarette/Vaping Substances     E-cigarette/Vaping Devices       , (Not in a hospital admission)  , Scheduled Meds:  chlorhexidine, 15 mL, Mouth/Throat, Q12H  enoxaparin, 40 mg, Subcutaneous, Q24H  famotidine, 20 mg, Intravenous, BID  levETIRAcetam, 500 mg, Intravenous, Q12H  LORazepam, , ,   sodium chloride, 10 mL, Intravenous, Q12H    , Continuous Infusions:  fentanyl 10 mcg/mL,  mcg/hr, Last Rate: 150 mcg/hr (11/17/21 1205)  midazolam, 1-10 mg/hr, Last Rate: 9 mg/hr (11/17/21 1201)  sodium chloride, 250 mL/hr, Last Rate: 250 mL/hr (11/17/21 1106)    , PRN Meds:  •  acetaminophen **OR** acetaminophen  •  albuterol  •  ondansetron  •  sodium chloride  •  sodium chloride and Allergies:  Bee pollen and Bee venom    Objective     Vital Signs   Blood pressure 119/67, pulse 90, temperature 100.4 °F (38 °C), temperature source Bladder, resp. rate 24, height 182.9 cm (72\"), weight 95.3 kg (210 lb), SpO2 100 %.    Physical Exam:   Well-developed young white man lying intubated and sedated on the ER [facility], intermittently mildly agitated as EEG leads being placed.  No clear response to verbal stim, more agitated with tactile stim.  No response to painful stim on the left.  Unable to " assess memory and fund of knowledge.  Pupils 4 mm and reactive, no response to visual threat, gaze conjugate, extraocular movements spontaneously intact with roving eye movements.  No obvious facial asymmetry.  No abnormal facial movement.  Unable to assess palate or shoulder elevation.  Spontaneous movements of trunk and right upper and lower extremity observed, some forceful.  No movement elicited left upper or lower extremity  No abnormal movement.  Muscle tone normal; reflexes trace to 1+ and symmetric, no response to plantar stim  Unable to assess coordination  HEENT: NCAT, some dried blood around nostrils;  unable to visualize tongue; ET tube in place.  Neck supple, no carotid bruit appreciated  Heart RRR no murmur appreciated  Lungs clear to auscultation, symmetric chest wall expansion  Abdomen soft, NT, ND with positive bowel sounds   Skin warm and dry, no rashes appreciated  Extremities without cyanosis or edema  Psych: Unable to assess      Results Review:   I reviewed the patient's new clinical results.  I reviewed the patient's new imaging results and agree with the interpretation.  I reviewed the patient's other test results and agree with the interpretation     Head CT images reviewed, agree no acute abnormality    Labs reviewed  ABG with pH 7.32 PCO2 31 PO2 139 bicarb 15.9  UDS negative  COVID-19 not detected  CBC white count 14 H&H 14 and 40 platelets 461  proBNP and troponin normal    Preliminary EEG shows slowing and suppression, more pronounced on the right, no epileptiform discharges.    Assessment/Plan       Status epilepticus (HCC)    Essential hypertension      21-year-old man with intractable epilepsy, with breakthrough prolonged (25 minutes) seizure today associated with desaturation into the 60s, now intubated.  No obvious seizure threshold lowering factor.  Patient normally takes Trokendi 100 mg and Trileptal 900/1200 with good compliance by report.  Has not tolerated Keppra well in the  past.  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  Wife reports postictal period typically lasts a day.  Low suspicion of CNS infection given lack of premonitory symptoms (and history of epilepsy); likelihood of e.g. stroke also very low.  Consider MRI pending course overnight.  Meanwhile, will place NG so patient can receive Trileptal.  Per outside records, level on this dose is high and not likely to benefit from additional OXC.  Will push TPM, avoid third agent unless necessary.    I discussed the patient's findings and my recommendations with family, nursing staff and primary care team    Erma Greer MD  11/17/21  12:06 EST            Electronically signed by Erma Greer MD at 11/17/21 2188

## 2021-11-18 NOTE — PROGRESS NOTES
"Pharmacy Consult-Vancomycin Dosing  Saturnino Henderson is a  21 y.o. male receiving vancomycin therapy.     Indication: CNS infection   Consulting Provider: Intensivist   ID Consult: No    Goal AUC: 400 - 600 mg/L*hr    Current Antimicrobial Therapy  Anti-Infectives (From admission, onward)      Ordered     Dose/Rate Route Frequency Start Stop    11/17/21 1900  vancomycin 1250 mg/250 mL 0.9% NS IVPB ([facility])        Ordering Provider: Stacy Schrader, RPH    1,250 mg  over 60 Minutes Intravenous Every 12 Hours 11/18/21 0800 11/25/21 0759    11/17/21 1853  vancomycin 2000 mg/500 mL 0.9% NS IVPB ([facility])        Ordering Provider: Stacy Schrader, RPH    20 mg/kg × 95.3 kg  over 120 Minutes Intravenous Once 11/17/21 2000 11/17/21 1553  cefTRIAXone (ROCEPHIN) 2 g/100 mL 0.9% NS IVPB ([facility])        Ordering Provider: Franklin Vigil MD    2 g  over 30 Minutes Intravenous Every 12 Hours Scheduled 11/17/21 1715 11/24/21 1759    11/17/21 1553  Pharmacy to dose vancomycin        Ordering Provider: Franklin Vigil MD     Does not apply Continuous PRN 11/17/21 1546 11/24/21 1545            Allergies  Allergies as of 11/17/2021 - Reviewed 11/17/2021   Allergen Reaction Noted    Bee pollen Anaphylaxis 08/20/2011    Bee venom Swelling 04/28/2011    Keppra [levetiracetam] Other (See Comments) and Hallucinations 11/17/2021       Labs    Results from last 7 days   Lab Units 11/17/21  0922   BUN mg/dL 13   CREATININE mg/dL 1.39*       Results from last 7 days   Lab Units 11/17/21  0922   WBC 10*3/mm3 14.18*       Evaluation of Dosing     Last Dose Received in the ED/Outside Facility: Vancomycin 2,000mg IV given 11/17 @ 1932 in ED  Is Patient on Dialysis or Renal Replacement: No    Ht - 182.9 cm (72.01\")  Wt - 95.3 kg (210 lb)    Estimated Creatinine Clearance: 99 mL/min (A) (by C-G formula based on SCr of 1.39 mg/dL (H)).    Intake & Output (last 3 days)         11/15 0701  11/16 0700 11/16 0701 11/17 0700 11/17 0701 11/18 " 0700    IV Piggyback   100    Total Intake(mL/kg)   100 (1)    Urine (mL/kg/hr)   525    Total Output   525    Net   -425           Urine Unmeasured Occurrence   3 x            Microbiology and Radiology  Microbiology Results (last 10 days)       Procedure Component Value - Date/Time    COVID PRE-OP / PRE-PROCEDURE SCREENING ORDER (NO ISOLATION) - Swab, Nasopharynx [[id number]]  (Normal) Collected: 11/17/21 1025    Lab Status: Final result Specimen: Swab from Nasopharynx Updated: 11/17/21 1053    Narrative:      The following orders were created for panel order COVID PRE-OP / PRE-PROCEDURE SCREENING ORDER (NO ISOLATION) - Swab, Nasopharynx.  Procedure                               Abnormality         Status                     ---------                               -----------         ------                     COVID-19 and FLU A/B PCR...[[id number]]  Normal              Final result                 Please view results for these tests on the individual orders.    COVID-19 and FLU A/B PCR - Swab, Nasopharynx [[id number]]  (Normal) Collected: 11/17/21 1025    Lab Status: Final result Specimen: Swab from Nasopharynx Updated: 11/17/21 1053     COVID19 Not Detected     Influenza A PCR Not Detected     Influenza B PCR Not Detected    Narrative:      Fact sheet for providers: https://www.fda.gov/media/080158/download    Fact sheet for patients: https://www.fda.gov/media/773875/download    Test performed by PCR.            Reported Vancomycin Levels                         [employer] AUC Calculation:    Current AUC: -- mg/L*hr    Predicted Steady State AUC on Current Dose:-- mg/L*hr  _________________________________    Predicted Steady State AUC on New Dose:  569 mg/L*hr    Assessment/Plan:    1. Pharmacy to dose vancomycin for CNS infection. Goal -600 mg/L*hr.  2. Patient received loading dose of vancomycin 2,000 mg (~21 mg/kg) IV on 11/17 @ 1930. Will initiate a maintenance dose of vancomycin 1250mg IV Q12hr to begin  on 11/18 @ 0800.  3. Assess clearance by trough level on 11/19 @ 0600, prior to 4th overall dose.  4. Pharmacy will continue to monitor renal function, cultures and sensitivities, and clinical status to adjust regimen as necessary.    Stacy Schrader, PharmD  11/17/2021   19:54 EST

## 2021-11-18 NOTE — THERAPY EVALUATION
Acute Care - Speech Language Pathology   Swallow Initial Evaluation [facility]   Clinical Swallow Evaluation     Patient Name: Saturnino Henderson  : 2000  MRN: [id number]  Today's Date: 2021               Admit Date: 2021    Visit Dx:     ICD-10-CM ICD-9-CM   1. Seizure ([facility])  R56.9 780.39   2. Status epilepticus ([facility])  G40.901 345.3   3. Acute respiratory failure with hypoxia ([facility])  J96.01 518.81   4. Hyponatremia  E87.1 276.1   5. Leukocytosis, unspecified type  D72.829 288.60   6. Dysphagia, unspecified type  R13.10 787.20     Patient Active Problem List   Diagnosis   • Status epilepticus (HCC)   • Essential hypertension   • Retinal detachment   • Acute hypoxemic respiratory failure (HCC)   • TBI (traumatic brain injury) ([facility])     Past Medical History:   Diagnosis Date   • Seizures ([facility])    • TBI (traumatic brain injury) ([facility])     Age 5     History reviewed. No pertinent surgical history.    SLP Recommendation and Plan  SLP Swallowing Diagnosis: suspected pharyngeal dysphagia (21)  SLP Diet Recommendation: NPO, other (see comments) (except 2-3 ice chips w/ staff, after oral care, and as tolerated) (21)     SLP Rec. for Method of Medication Administration: meds via alternate route (21)        Recommended Diagnostics: reassess via clinical swallow evaluation (21)  Swallow Criteria for Skilled Therapeutic Interventions Met: demonstrates skilled criteria (21)  Anticipated Discharge Disposition (SLP): anticipate therapy at next level of care (21)  Rehab Potential/Prognosis, Swallowing: good, to achieve stated therapy goals (21)              Plan of Care Reviewed With: patient, spouse  Progress: no change      SWALLOW EVALUATION (last 72 hours)     SLP Adult Swallow Evaluation     Row Name 21                   Rehab Evaluation    Document Type evaluation  -AC        Subjective Information no complaints  -AC         Patient Observations alert; cooperative  -AC        Patient/Family/Caregiver Comments/Observations Pt confused. Spouse present.  -AC        Patient Effort good  -AC                  General Information    Patient Profile Reviewed yes  -AC        Pertinent History Of Current Problem Adm status epilepticus, resp failure. Intubated 11/17-11/18 (~25 hrs). Failed RN post-extubation screen. Hx TBI w/ ICH age 5.  -AC        Current Method of Nutrition NPO  -AC        Precautions/Limitations, Vision WFL; for purposes of eval  -AC        Precautions/Limitations, Hearing WFL; for purposes of eval  -AC        Prior Level of Function-Communication unknown  -AC        Prior Level of Function-Swallowing no diet consistency restrictions  -AC        Plans/Goals Discussed with patient; spouse/S.O.; agreed upon  -AC        Barriers to Rehab medically complex  -AC        Patient's Goals for Discharge return to PO diet  -AC        Family Goals for Discharge family did not state  -AC                  Pain    Additional Documentation Pain Scale: FACES Pre/Post-Treatment (Group)  -AC                  Pain Scale: FACES Pre/Post-Treatment    Pain: FACES Scale, Pretreatment 0-->no hurt  -AC        Posttreatment Pain Rating 0-->no hurt  -AC                  Oral Motor Structure and Function    Dentition Assessment natural, present and adequate  -AC        Secretion Management WNL/WFL  -AC        Mucosal Quality dry  -AC        Volitional Swallow WFL  -AC        Volitional Cough weak  -AC                  Oral Musculature and Cranial Nerve Assessment    Oral Motor General Assessment generalized oral motor weakness  -AC                  General Eating/Swallowing Observations    Eating/Swallowing Skills fed by SLP; self-fed; other (see comments)  needed assist - impulsive self-feeding behaviors  -AC        Positioning During Eating upright 90 degree; upright in bed  -AC        Utensils Used spoon; cup; straw  -AC        Consistencies Trialed ice  "chips; thin liquids; nectar/syrup-thick liquids; pureed  -AC                  Clinical Swallow Eval    Oral Prep Phase WFL  -AC        Oral Transit WFL  -AC        Oral Residue WFL  -AC        Pharyngeal Phase suspected pharyngeal impairment  -                  Pharyngeal Phase Concerns    Pharyngeal Phase Concerns cough; throat clear  -        Cough all consistencies  -        Throat Clear all consistencies  -        Pharyngeal Phase Concerns, Comment At first, no overt clinical s/sxs aspiration w/ thin liquid and puree trials. Then pt exhibited hard coughing following straw drinks of thin liquid. Several minutes later, cont'd to exhibit coughing and throat clearing w/ all PO trials x single ice chips. At one point, pt coughed up what he thought was a \"tonsil rock.\" RN notified.  -                  Clinical Impression    SLP Swallowing Diagnosis suspected pharyngeal dysphagia  -        Functional Impact risk of aspiration/pneumonia; risk of malnutrition; risk of dehydration  -        Rehab Potential/Prognosis, Swallowing good, to achieve stated therapy goals  -        Swallow Criteria for Skilled Therapeutic Interventions Met demonstrates skilled criteria  -                  Recommendations    SLP Diet Recommendation NPO; other (see comments)  except 2-3 ice chips w/ staff, after oral care, and as tolerated  -        Recommended Diagnostics reassess via clinical swallow evaluation  -        Oral Care Recommendations Oral Care BID/PRN; Suction toothbrush  -        SLP Rec. for Method of Medication Administration meds via alternate route  -        Anticipated Discharge Disposition (SLP) anticipate therapy at next level of care  -              User Key  (r) = Recorded By, (t) = Taken By, (c) = Cosigned By    Initials Name Effective Dates     Luba Vasquez MS CCC-SLP 06/16/21 -                 EDUCATION  The patient has been educated in the following areas:   Dysphagia (Swallowing " Impairment) Oral Care/Hydration NPO rationale.              Time Calculation:    Time Calculation- SLP     Row Name 11/18/21 1733             Time Calculation- SLP    SLP Start Time 1600  -AC      SLP Received On 11/18/21  -AC              Untimed Charges    37740-WO Eval Oral Pharyng Swallow Minutes 53  -AC              Total Minutes    Untimed Charges Total Minutes 53  -AC       Total Minutes 53  -AC            User Key  (r) = Recorded By, (t) = Taken By, (c) = Cosigned By    Initials Name Provider Type    AC Luba Vasquez MS CCC-SLP Speech and Language Pathologist                Therapy Charges for Today     Code Description Service Date Service Provider Modifiers Qty    [id number] HC ST EVAL ORAL PHARYNG SWALLOW 4 11/18/2021 Luba Vasquez MS CCC-SLP GN 1        Patient was not wearing a face mask and did exhibit coughing during this therapy encounter.  Procedure performed was aerosolizing, involved close contact (within 6 feet for at least 15 minutes or longer), and involved contact with infectious secretions or specimens.  Therapist used appropriate personal protective equipment including gloves, standard procedure mask, eye protection and N95 mask.  Appropriate PPE was worn during the entire therapy session.  Hand hygiene was completed before and after therapy session.          MS L[facility] CarlsonSLP  11/18/2021

## 2021-11-18 NOTE — PLAN OF CARE
Goal Outcome Evaluation:  Plan of Care Reviewed With: patient, spouse        Progress: no change   SLP evaluation completed. Will  f/u for further swallow assessment. Please see note for further details and recommendations.

## 2021-11-18 NOTE — NURSING NOTE
In order to get patient to leave on cardiac leads and blood pressure cuff, RN had to come sit at bedside.

## 2021-11-19 VITALS
HEIGHT: 72 IN | SYSTOLIC BLOOD PRESSURE: 166 MMHG | TEMPERATURE: 99.4 F | WEIGHT: 200.84 LBS | DIASTOLIC BLOOD PRESSURE: 99 MMHG | OXYGEN SATURATION: 95 % | RESPIRATION RATE: 18 BRPM | HEART RATE: 84 BPM | BODY MASS INDEX: 27.2 KG/M2

## 2021-11-19 PROBLEM — J96.01 ACUTE HYPOXEMIC RESPIRATORY FAILURE (HCC): Status: RESOLVED | Noted: 2021-11-17 | Resolved: 2021-11-19

## 2021-11-19 LAB
ALBUMIN SERPL-MCNC: 4.4 G/DL (ref 3.5–5.2)
ANION GAP SERPL CALCULATED.3IONS-SCNC: 15 MMOL/L (ref 5–15)
BASOPHILS # BLD AUTO: 0.01 10*3/MM3 (ref 0–0.2)
BASOPHILS NFR BLD AUTO: 0.1 % (ref 0–1.5)
BUN SERPL-MCNC: 19 MG/DL (ref 6–20)
BUN/CREAT SERPL: 15.4 (ref 7–25)
CALCIUM SPEC-SCNC: 9.8 MG/DL (ref 8.6–10.5)
CHLORIDE SERPL-SCNC: 109 MMOL/L (ref 98–107)
CO2 SERPL-SCNC: 17 MMOL/L (ref 22–29)
CREAT SERPL-MCNC: 1.23 MG/DL (ref 0.76–1.27)
DEPRECATED RDW RBC AUTO: 42.4 FL (ref 37–54)
EOSINOPHIL # BLD AUTO: 0 10*3/MM3 (ref 0–0.4)
EOSINOPHIL NFR BLD AUTO: 0 % (ref 0.3–6.2)
ERYTHROCYTE [DISTWIDTH] IN BLOOD BY AUTOMATED COUNT: 12.8 % (ref 12.3–15.4)
GFR SERPL CREATININE-BSD FRML MDRD: 73 ML/MIN/1.73
GLUCOSE SERPL-MCNC: 96 MG/DL (ref 65–99)
HCT VFR BLD AUTO: 36.4 % (ref 37.5–51)
HGB BLD-MCNC: 12.5 G/DL (ref 13–17.7)
IMM GRANULOCYTES # BLD AUTO: 0.14 10*3/MM3 (ref 0–0.05)
IMM GRANULOCYTES NFR BLD AUTO: 0.9 % (ref 0–0.5)
LYMPHOCYTES # BLD AUTO: 1.59 10*3/MM3 (ref 0.7–3.1)
LYMPHOCYTES NFR BLD AUTO: 10.6 % (ref 19.6–45.3)
MAGNESIUM SERPL-MCNC: 2.1 MG/DL (ref 1.6–2.6)
MCH RBC QN AUTO: 30.9 PG (ref 26.6–33)
MCHC RBC AUTO-ENTMCNC: 34.3 G/DL (ref 31.5–35.7)
MCV RBC AUTO: 89.9 FL (ref 79–97)
MONOCYTES # BLD AUTO: 1.38 10*3/MM3 (ref 0.1–0.9)
MONOCYTES NFR BLD AUTO: 9.2 % (ref 5–12)
NEUTROPHILS NFR BLD AUTO: 11.85 10*3/MM3 (ref 1.7–7)
NEUTROPHILS NFR BLD AUTO: 79.2 % (ref 42.7–76)
NRBC BLD AUTO-RTO: 0 /100 WBC (ref 0–0.2)
PHOSPHATE SERPL-MCNC: 2.5 MG/DL (ref 2.5–4.5)
PLATELET # BLD AUTO: 310 10*3/MM3 (ref 140–450)
PMV BLD AUTO: 10.5 FL (ref 6–12)
POTASSIUM SERPL-SCNC: 3.5 MMOL/L (ref 3.5–5.2)
RBC # BLD AUTO: 4.05 10*6/MM3 (ref 4.14–5.8)
SODIUM SERPL-SCNC: 141 MMOL/L (ref 136–145)
WBC NRBC COR # BLD: 14.97 10*3/MM3 (ref 3.4–10.8)

## 2021-11-19 PROCEDURE — 83735 ASSAY OF MAGNESIUM: CPT | Performed by: INTERNAL MEDICINE

## 2021-11-19 PROCEDURE — 92610 EVALUATE SWALLOWING FUNCTION: CPT

## 2021-11-19 PROCEDURE — 80069 RENAL FUNCTION PANEL: CPT | Performed by: INTERNAL MEDICINE

## 2021-11-19 PROCEDURE — 85025 COMPLETE CBC W/AUTO DIFF WBC: CPT | Performed by: INTERNAL MEDICINE

## 2021-11-19 PROCEDURE — 99232 SBSQ HOSP IP/OBS MODERATE 35: CPT | Performed by: PSYCHIATRY & NEUROLOGY

## 2021-11-19 PROCEDURE — 99239 HOSP IP/OBS DSCHRG MGMT >30: CPT | Performed by: NURSE PRACTITIONER

## 2021-11-19 RX ORDER — POTASSIUM CHLORIDE 750 MG/1
40 CAPSULE, EXTENDED RELEASE ORAL AS NEEDED
Status: DISCONTINUED | OUTPATIENT
Start: 2021-11-19 | End: 2021-11-19 | Stop reason: HOSPADM

## 2021-11-19 RX ORDER — TOPIRAMATE 100 MG/1
100 TABLET, FILM COATED ORAL ONCE
Status: DISCONTINUED | OUTPATIENT
Start: 2021-11-19 | End: 2021-11-19 | Stop reason: HOSPADM

## 2021-11-19 RX ORDER — DOXYCYCLINE HYCLATE 100 MG/1
100 CAPSULE ORAL 2 TIMES DAILY
Qty: 14 CAPSULE | Refills: 0 | Status: SHIPPED | OUTPATIENT
Start: 2021-11-19

## 2021-11-19 RX ORDER — LISINOPRIL 10 MG/1
10 TABLET ORAL
Status: DISCONTINUED | OUTPATIENT
Start: 2021-11-19 | End: 2021-11-19 | Stop reason: HOSPADM

## 2021-11-19 RX ORDER — POTASSIUM CHLORIDE 1.5 G/1.77G
40 POWDER, FOR SOLUTION ORAL AS NEEDED
Status: DISCONTINUED | OUTPATIENT
Start: 2021-11-19 | End: 2021-11-19 | Stop reason: HOSPADM

## 2021-11-19 RX ORDER — TOPIRAMATE 50 MG/1
150 TABLET, FILM COATED ORAL EVERY 12 HOURS SCHEDULED
Qty: 180 TABLET | Refills: 2 | Status: SHIPPED | OUTPATIENT
Start: 2021-11-20

## 2021-11-19 RX ADMIN — LISINOPRIL 10 MG: 10 TABLET ORAL at 10:18

## 2021-11-19 RX ADMIN — TOPIRAMATE 200 MG: 100 TABLET, FILM COATED ORAL at 08:38

## 2021-11-19 RX ADMIN — SODIUM CHLORIDE, PRESERVATIVE FREE 10 ML: 5 INJECTION INTRAVENOUS at 08:39

## 2021-11-19 RX ADMIN — FAMOTIDINE 20 MG: 10 INJECTION, SOLUTION INTRAVENOUS at 08:38

## 2021-11-19 RX ADMIN — POTASSIUM CHLORIDE 40 MEQ: 1.5 POWDER, FOR SOLUTION ORAL at 10:18

## 2021-11-19 RX ADMIN — OXCARBAZEPINE 900 MG: 300 TABLET, FILM COATED ORAL at 08:38

## 2021-11-19 NOTE — CASE MANAGEMENT/SOCIAL WORK
Continued Stay Note  [facility]     Patient Name: Saturnino Henderson  MRN: [id number]  Today's Date: 11/19/2021    Admit Date: 11/17/2021     Discharge Plan     Row Name 11/19/21 1147       Plan    Plan Home with wife and family can assist if needed.    Patient/Family in Agreement with Plan yes    Plan Comments Spoke to pt and his dad at . Pt lives with his wife in [facility] and plans are to return home with wife and family can assist if needed. Pt is out of restraints and extubated yesterday, sitter remains at bs. Pt more alert today. Plan is home, no dishcarge needs at this time. CM will follow, Casie Gallego RN,  ext. [phone]    Final Discharge Disposition Code 01 - home or self-care               Discharge Codes    No documentation.               Expected Discharge Date and Time     Expected Discharge Date Expected Discharge Time    Nov 26, 2021             Casie Gallego RN

## 2021-11-19 NOTE — PLAN OF CARE
Goal Outcome Evaluation:  Plan of Care Reviewed With: patient, family        Progress: improving     SLP dysphagia re-evaluation completed. Much improved, safe for regular diets, cues from family/staff as needed if too impulsive with intake. No further SLP needs. Will sign-off. Please see note for further details and recommendations.

## 2021-11-19 NOTE — PLAN OF CARE
Goal Outcome Evaluation:  Plan of Care Reviewed With: patient        Progress: improving  Outcome Summary: Intitially disoriented to place and time and somewhat spontaneous during shift. Throughout shift patient has slept. Initially when awakening was more disoriented but as shift has went on patient more oriented overall and more appopriate when awakening. Remains sinus tach with exertion but otherwise NSR.  strong and equal.

## 2021-11-19 NOTE — DISCHARGE SUMMARY
Discharge Summary    Patient name: Saturnino Henderson  CSN: [id number]  MRN: [id number]  : 2000  Today's date: 2021     Date of Admission: 2021  Date of Discharge:  2021    Admitting Physician:  Dr. Vigil  Primary Care Provider: Provider, No Known  Consultations:   Neurology:  Dr. Greer    Admission Diagnosis:  Status Epilepticus     Discharge Diagnoses:   Active Hospital Problems    Diagnosis    • **Status epilepticus (HCC)    • TBI (traumatic brain injury) ([facility]CC)      Age 5     • Essential hypertension      Allergies:  Bee pollen, Bee venom, and Keppra [levetiracetam]    Code Status and Medical Interventions:   Ordered at: 21 1028     Code Status (Patient has no pulse and is not breathing):    CPR (Attempt to Resuscitate)     Medical Interventions (Patient has pulse or is breathing):    Full Support     Procedures/Testin21 EEG - Study shows evidence for focal dysfunction of the right hemisphere.  No evidence for ongoing seizures are seen    History of Present Illness:  Saturnino Henderson is a 21 y.o. male, non-smoker, with a past medical history significant for traumatic brain injury, seizures, hypertension, and retinal detachment.  The patient presents to [facility] ED via EMS for seizure activity.  Information is obtained from the patient's wife as he is intubated and poorly responsive.  She reports that he had a seizure which lasted approximately 1 minute at 4 AM this morning.  He again had a seizure which lasted approximately 25 minutes at proximately 825 AM this morning at which time she called EMS.  On their arrival to the scene attempt was made to intubate the patient as his oxygen saturations were found to be 60-70%, and he was otherwise unable to protect his airway.  He continued to seize, and oxygen saturation remained low following intubation.  There was concern that intubation was unsuccessful.  On arrival to ED patient appeared to be postictal and was intubated  "successfully.  He did not appear to move his left side, was tumescent.  With a concern for other neurological issues the patient was taken emergently for CT scan further evaluation.  This did not show evidence of intracranial abnormality.  He was notably incontinent of urine with estimate of approximately 800 mL by nursing staff.  Significant labs: ABG pH 7.32/PCO2 30.2/PO2 139/bicarb 15.9/base deficit 8.8, glucose 236, sodium 127, creatinine 1.39, WBCs 14.18, BNP 77.9.     He is followed by neurology in [facility] for his seizures.  It appears that the patient has taken Topamax, and Trileptal in the past. His wife reports that he has been vaccinated for Covid-19, and that the second vaccination \"caused a seizure\" which occurred later the day of vaccination.  Prior to presentation she reports that he had been in his normal state of health, and denied recent fever, chills, chest pain or shortness of air, nausea/vomiting/constipation/diarrhea.  He has however had worsening headaches/migraines recently.  She reports that his seizure activity seems to worsen with stress, and sleep deprivation.     The patient developed agitation when he was taken for a head CT scan and was right side/thrashing about.  He was apparently left hemiparetic at that time.  Additional sedation was given.     On my evaluation, the patient's sedated and unresponsive.  He is on Versed and fentanyl drips.    Hospital Course:  He had an EEG with results above.  He was placed on antibiotics for possible meningitis.  He was successfully extubated the following day.  After extubation, the antibiotics were discontinued due to mentation and clinical exam proving meningitis to be unlikely.  He had some secretions with mildly elevated WBC with temps 100-101.  Sputum culture reveals heavy growth Staph Aureus.  CXR was unremarkable.  He was continued on Trileptal and Topamax was increased.  He has not had any more witnessed seizures.  " "Mentation has continued to improve.  He has been cleared to go home today with outpatient follow up with primary Neurologist.  There are plans for outpatient referral to Neurology at [facility].  He will be discharged home on a course of Doxycycline for positive sputum culture.    Vitals:  /99 (BP Location: Left arm, Patient Position: Lying)   Pulse 84   Temp 99.4 °F (37.4 °C) (Oral)   Resp 18   Ht 182.9 cm (72.01\")   Wt 91.1 kg (200 lb 13.4 oz)   SpO2 95%   BMI 27.23 kg/m²     Physical Exam:  Constitutional:  Appears well-developed and well-nourished. No distress.   HEENT:  Normocephalic and atraumatic. PERRL  Neck:  Neck supple. No JVD present.   CV: Normal rate, regular rhythm,  intact distal pulses.  No gallop, murmur or rub.  Pulmonary/Chest: Effort normal and breath sounds normal. No respiratory distress. No wheezes, rhonchi or rales.   Abdominal: Soft. +BS. No distension and no mass. There is no tenderness.   Musculoskeletal: Normal muscle tone and strength  Neurological: Alert and oriented to person, place, and time.    Skin: Skin is warm and dry. No rash noted.   Extremities:  No clubbing, edema or cyanosis  Psychiatric: Normal mood and affect. Behavior is normal.     Labs:  Results from last 7 days   Lab Units 11/19/21  0522   WBC 10*3/mm3 14.97*   HEMOGLOBIN g/dL 12.5*   HEMATOCRIT % 36.4*   PLATELETS 10*3/mm3 310     Results from last 7 days   Lab Units 11/19/21  0522 11/18/21  0227 11/17/21  0922   SODIUM mmol/L 141   < > 127*   POTASSIUM mmol/L 3.5   < > 3.9   CHLORIDE mmol/L 109*   < > 92*   CO2 mmol/L 17.0*   < > 13.0*   BUN mg/dL 19   < > 13   CREATININE mg/dL 1.23   < > 1.39*   CALCIUM mg/dL 9.8   < > 9.2   BILIRUBIN mg/dL  --   --  0.2   ALK PHOS U/L  --   --  143*   ALT (SGPT) U/L  --   --  13   AST (SGOT) U/L  --   --  14   GLUCOSE mg/dL 96   < > 236*    < > = values in this interval not displayed.         Magnesium   Date Value Ref Range Status   11/19/2021 2.1 1.6 - " 2.6 mg/dL Final   11/18/2021 2.6 1.6 - 2.6 mg/dL Final     Phosphorus   Date Value Ref Range Status   11/19/2021 2.5 2.5 - 4.5 mg/dL Final   11/18/2021 5.3 (H) 2.5 - 4.5 mg/dL Final              Discharge Medications      New Medications      Instructions Start Date   doxycycline 100 MG capsule  Commonly known as: VIBRAMYCIN   100 mg, Oral, 2 Times Daily         Changes to Medications      Instructions Start Date   topiramate 50 MG tablet  Commonly known as: TOPAMAX  What changed:   · how much to take  · when to take this  · additional instructions   150 mg, Oral, Every 12 Hours Scheduled   Start Date: November 20, 2021        Continue These Medications      Instructions Start Date   EPINEPHrine 0.3 MG/0.3ML solution auto-injector injection  Commonly known as: EPIPEN   0.3 mg, Intramuscular      lisinopril 10 MG tablet  Commonly known as: PRINIVIL,ZESTRIL   10 mg, Oral, Daily      LORazepam 0.5 MG tablet  Commonly known as: ATIVAN   0.5 mg, Oral, 2 Times Daily      OXcarbazepine 600 MG tablet  Commonly known as: TRILEPTAL   600 mg, Oral, Patient states that he takes 1.5 tablets in the morning, and 2 tablets at night           Diet Instructions     Diet: Regular; Thin      Discharge Diet: Regular    Fluid Consistency: Thin        Activity Instructions     Activity as Tolerated     Additional Activity Instructions:    No driving for 90 days.         Follow-up Appointments  No future appointments.  Additional Instructions for the Follow-ups that You Need to Schedule     Discharge Follow-up with PCP   As directed       Currently Documented PCP:    Provider, No Known    PCP Phone Number:    None     Follow Up Details: one week         Discharge Follow-up with Specified Provider: Neurologist at [facility]   As directed      To: Neurologist at [facility]    Follow Up Details: 2-4 weeks         Discharge Follow-up with Specified Provider: Neurologist at [facility]   As directed      To: Neurologist at  [facility]    Follow Up Details: referral being made by Primary Neurologist        Additional information on Labs and Follow-ups:    Spoke with Pts father, Rodri Henderson. He stated that he was already in contact with Pt primary Neurologist and that the office stated to contact them for F/U appointments after the pt has been discharged from the hospital.          Discharge Instructions:  Ok to go home today  Follow up as above  Scripts sent electronically     DURGA Man, AGACNP-BC, FNP-BC  Pulmonary & Critical Care Medicine.    Time: I spent  45  minutes on this discharge activity which included: face-to-face encounter with the patient, reviewing the data in the system, coordination of the care with the nursing staff as well as consultants, documentation, and entering orders.      CC: Provider, No Known

## 2021-11-19 NOTE — PROGRESS NOTES
"Neurology       Patient Care Team:  Provider, No Known as PCP - Joseph Lane MD as Consulting Physician (Neurology)    Chief complaint breakthrough prolonged seizure with respiratory compromise      Subjective .     History: Mother at bedside notes patient is quite bit better today, has been discussing recent events appropriately.  Memory not back to baseline.  He is often depressed for a couple of weeks after a seizure.  Father in contact with his neurologist at [facility] and also notes plan for evaluation by neurologist at [facility].  Patient denies headache, vision trouble, or pain anywhere.  Denies muscle soreness.  Says he feels okay.  Would like to go home.    ROS: No fever or shortness of breath    Objective     Vital Signs   Blood pressure 145/80, pulse 88, temperature 98.1 °F (36.7 °C), temperature source Axillary, resp. rate 16, height 182.9 cm (72.01\"), weight 91.1 kg (200 lb 13.4 oz), SpO2 95 %.    Physical Exam:              Neuro: Well-developed young white man in no acute distress.  Speaks little, but answers appropriately; speech fluent and not aphasic.  Drowsy and falls asleep while I am talking with his father.  Decreased attention.  Pupils 3.5 mm and reactive EOMI face symmetric  Moves all extremities well and  equal and strong; normal and symmetric lower extremity strength    Results Review:              Labs reviewed  Magnesium normal  Renal function panel with chloride 109 CO2 17 otherwise normal  CBC white count 14.97 H&H 12.5 and 36 platelets 310    Assessment/Plan     22 yo man with intractable epilepsy, with prolonged breakthrough seizure with respiratory compromise, with no obvious cause for recurrent seizure (no illness, medication noncompliance, alcohol use or sleep deprivation).  Postictal left hemiparesis has resolved.  EEG with no evidence of ongoing seizures (but very mild right hemisphere slowing and suppression).  Brain MRI with no acute " abnormality.  Antibiotics initiated after patient developed fever on the day of admission, to cover for meningitis.  Discontinued yesterday with no fevers and clinical improvement.   Trileptal continued at home dose, topiramate was increased to 400 mg, will decrease to 300 mg today.  Likely could be further decreased to 200 mg in the short-term.  Discussed potential side effects of topiramate including mood effects, especially at higher doses.  Patient okay for discharge home today from neurology perspective, given his improvement, family able to stay with him, and that he is following typical, albeit somewhat prolonged course of improvement following seizure.    Discussed that decreasing of topiramate can be directed by his primary neurologist, with whom father has already been in contact.  Also encouraged consideration of resective surgery if found to be a candidate for epilepsy surgery.      I discussed the patients findings and my recommendations with patient, family, nursing staff and primary care team    Erma Greer MD  11/19/21  11:52 EST

## 2021-11-19 NOTE — DISCHARGE INSTR - LAB
Spoke with Pts father, Rodri Henderson. He stated that he was already in contact with Pt primary Neurologist and that the office stated to contact them for F/U appointments after the pt has been discharged from the hospital.

## 2021-11-20 ENCOUNTER — HOSPITAL ENCOUNTER (EMERGENCY)
Facility: HOSPITAL | Age: 21
Discharge: HOME | End: 2021-11-20
Attending: EMERGENCY MEDICINE | Admitting: EMERGENCY MEDICINE

## 2021-11-20 ENCOUNTER — NURSE TRIAGE (OUTPATIENT)
Dept: OTHER | Facility: CLINIC | Age: 21
End: 2021-11-20

## 2021-11-20 ENCOUNTER — APPOINTMENT (OUTPATIENT)
Dept: CARDIOLOGY | Facility: HOSPITAL | Age: 21
End: 2021-11-20

## 2021-11-20 VITALS
SYSTOLIC BLOOD PRESSURE: 153 MMHG | OXYGEN SATURATION: 96 % | TEMPERATURE: 98.2 F | WEIGHT: 200 LBS | HEART RATE: 85 BPM | HEIGHT: 71 IN | BODY MASS INDEX: 28 KG/M2 | RESPIRATION RATE: 18 BRPM | DIASTOLIC BLOOD PRESSURE: 100 MMHG

## 2021-11-20 DIAGNOSIS — M79.605 PAIN OF LEFT LOWER EXTREMITY: Primary | ICD-10-CM

## 2021-11-20 LAB
BACTERIA SPEC RESP CULT: ABNORMAL
BH CV ECHO MEAS - BSA(HAYCOCK): 2.2 M^2
BH CV ECHO MEAS - BSA: 2.1 M^2
BH CV ECHO MEAS - BZI_BMI: 27.1 KILOGRAMS/M^2
BH CV ECHO MEAS - BZI_METRIC_HEIGHT: 182.9 CM
BH CV ECHO MEAS - BZI_METRIC_WEIGHT: 90.7 KG
BH CV LOWER VASCULAR LEFT COMMON FEMORAL AUGMENT: NORMAL
BH CV LOWER VASCULAR LEFT COMMON FEMORAL COMPRESS: NORMAL
BH CV LOWER VASCULAR LEFT COMMON FEMORAL PHASIC: NORMAL
BH CV LOWER VASCULAR LEFT COMMON FEMORAL SPONT: NORMAL
BH CV LOWER VASCULAR LEFT DISTAL FEMORAL AUGMENT: NORMAL
BH CV LOWER VASCULAR LEFT DISTAL FEMORAL COMPRESS: NORMAL
BH CV LOWER VASCULAR LEFT DISTAL FEMORAL PHASIC: NORMAL
BH CV LOWER VASCULAR LEFT DISTAL FEMORAL SPONT: NORMAL
BH CV LOWER VASCULAR LEFT GASTRONEMIUS COMPRESS: NORMAL
BH CV LOWER VASCULAR LEFT GREATER SAPH AK COMPRESS: NORMAL
BH CV LOWER VASCULAR LEFT GREATER SAPH BK COMPRESS: NORMAL
BH CV LOWER VASCULAR LEFT LESSER SAPH COMPRESS: NORMAL
BH CV LOWER VASCULAR LEFT MID FEMORAL AUGMENT: NORMAL
BH CV LOWER VASCULAR LEFT MID FEMORAL COMPRESS: NORMAL
BH CV LOWER VASCULAR LEFT MID FEMORAL PHASIC: NORMAL
BH CV LOWER VASCULAR LEFT MID FEMORAL SPONT: NORMAL
BH CV LOWER VASCULAR LEFT PERONEAL COMPRESS: NORMAL
BH CV LOWER VASCULAR LEFT POPLITEAL AUGMENT: NORMAL
BH CV LOWER VASCULAR LEFT POPLITEAL COMPRESS: NORMAL
BH CV LOWER VASCULAR LEFT POPLITEAL PHASIC: NORMAL
BH CV LOWER VASCULAR LEFT POPLITEAL SPONT: NORMAL
BH CV LOWER VASCULAR LEFT POSTERIOR TIBIAL COMPRESS: NORMAL
BH CV LOWER VASCULAR LEFT PROFUNDA FEMORAL AUGMENT: NORMAL
BH CV LOWER VASCULAR LEFT PROFUNDA FEMORAL COMPRESS: NORMAL
BH CV LOWER VASCULAR LEFT PROFUNDA FEMORAL PHASIC: NORMAL
BH CV LOWER VASCULAR LEFT PROFUNDA FEMORAL SPONT: NORMAL
BH CV LOWER VASCULAR LEFT PROXIMAL FEMORAL AUGMENT: NORMAL
BH CV LOWER VASCULAR LEFT PROXIMAL FEMORAL COMPRESS: NORMAL
BH CV LOWER VASCULAR LEFT PROXIMAL FEMORAL PHASIC: NORMAL
BH CV LOWER VASCULAR LEFT PROXIMAL FEMORAL SPONT: NORMAL
BH CV LOWER VASCULAR LEFT SAPHENOFEMORAL JUNCTION AUGMENT: NORMAL
BH CV LOWER VASCULAR LEFT SAPHENOFEMORAL JUNCTION COMPRESS: NORMAL
BH CV LOWER VASCULAR LEFT SAPHENOFEMORAL JUNCTION PHASIC: NORMAL
BH CV LOWER VASCULAR LEFT SAPHENOFEMORAL JUNCTION SPONT: NORMAL
BH CV LOWER VASCULAR LEFT SOLEAL COMPRESS: NORMAL
BH CV LOWER VASCULAR RIGHT COMMON FEMORAL AUGMENT: NORMAL
BH CV LOWER VASCULAR RIGHT COMMON FEMORAL COMPRESS: NORMAL
BH CV LOWER VASCULAR RIGHT COMMON FEMORAL PHASIC: NORMAL
BH CV LOWER VASCULAR RIGHT COMMON FEMORAL SPONT: NORMAL
GRAM STN SPEC: ABNORMAL

## 2021-11-20 PROCEDURE — 93971 EXTREMITY STUDY: CPT | Performed by: INTERNAL MEDICINE

## 2021-11-20 PROCEDURE — 99282 EMERGENCY DEPT VISIT SF MDM: CPT

## 2021-11-20 PROCEDURE — 93971 EXTREMITY STUDY: CPT

## 2021-11-20 NOTE — TELEPHONE ENCOUNTER
Brief description of triage: Evert marte seizures, was in [facility] intubated and in ICU. D/C yesterday. On the way home complained about left leg pain and slightly swollen. Still with pain and swelling today. Has a little chest pain and temp of 99.8. Triage indicates for patient to go to ED. Care advice provided, patient verbalizes understanding; denies any other questions or concerns; instructed to call back for any new or worsening symptoms. This triage is a result of a call to [address] [place] [address]. Please do not respond to the triage nurse through this encounter. Any subsequent communication should be directly with the patient. Reason for Disposition   Chest pain    Answer Assessment - Initial Assessment Questions  1. ONSET: \"When did the pain start? \"       Started yesterday after being discharged from hospital for a seizure    2. LOCATION: \"Where is the pain located? \"       Left leg/shin area    3. PAIN: \"How bad is the pain? \"    (Scale 1-10; or mild, moderate, severe)    -  MILD (1-3): doesn't interfere with normal activities     -  MODERATE (4-7): interferes with normal activities (e.g., work or school) or awakens from sleep, limping     -  SEVERE (8-10): excruciating pain, unable to do any normal activities, unable to walk      Pain today is 6-7/10  Pain when walking    4. WORK OR EXERCISE: \"Has there been any recent work or exercise that involved this part of the body?\"        5. CAUSE: \"What do you think is causing the leg pain? \"      Was recently in hospital    6. OTHER SYMPTOMS: \"Do you have any other symptoms? \" (e.g., chest pain, back pain, breathing difficulty, swelling, rash, fever, numbness, weakness)      Some swelling and redness  Is having some chest pain  No difficulty breathing  No fever  No weakness, numbness or tingling    7. PREGNANCY: \"Is there any chance you are pregnant? \" \"When was your last menstrual period? \"      na    Protocols used: LEG PAIN-ADULT-

## 2021-11-20 NOTE — ED PROVIDER NOTES
Subjective   Saturnino Henderson is a 21-year-old male that presents emergency department complaints of left lower extremity pain.  Patient reports pain to his left calf and left anterior shin over the past 5 days.  Patient denies any injury.  Patient was sent here to rule out a DVT.  Patient explains he was recently in the hospital for seizure activity.  Patient advises that he had to be intubated at this time.  Patient denies any chest pain or shortness of breath.  Denies any history of DVT, PE.      History provided by:  Patient   used: No    Leg Pain  Location:  Leg  Time since incident:  5 days  Injury: no    Leg location:  L lower leg  Pain details:     Quality:  Aching    Severity:  Mild    Timing:  Constant    Progression:  Unchanged  Associated symptoms: no decreased ROM, no fever, no numbness and no tingling        Review of Systems   Constitutional: Negative for chills and fever.   Respiratory: Negative for shortness of breath.    Cardiovascular: Negative for chest pain.   Musculoskeletal:        Lt leg pain   Neurological: Negative for weakness and numbness.   All other systems reviewed and are negative.      Past Medical History:   Diagnosis Date   • Seizures ([facility])    • TBI (traumatic brain injury) ([facility])     Age 5       Allergies   Allergen Reactions   • Bee Pollen Anaphylaxis     Bee stings   • Bee Venom Swelling     Swelling of the face and the sting area      • Keppra [Levetiracetam] Other (See Comments) and Hallucinations     Father reports extreme hallucinations and aggressiveness with Keppra         History reviewed. No pertinent surgical history.    History reviewed. No pertinent family history.    Social History     Socioeconomic History   • Marital status:    Tobacco Use   • Smoking status: Never Smoker   • Smokeless tobacco: Never Used   Substance and Sexual Activity   • Alcohol use: Yes     Comment: every other week   • Drug use: Never           Objective   Physical  Exam  Vitals and nursing note reviewed.   Constitutional:       General: He is not in acute distress.     Appearance: Normal appearance. He is not ill-appearing or toxic-appearing.   HENT:      Head: Normocephalic and atraumatic.      Nose: Nose normal.      Mouth/Throat:      Mouth: Mucous membranes are moist.   Eyes:      Extraocular Movements: Extraocular movements intact.      Pupils: Pupils are equal, round, and reactive to light.   Cardiovascular:      Rate and Rhythm: Normal rate and regular rhythm.      Pulses: Normal pulses.      Heart sounds: Normal heart sounds.   Pulmonary:      Effort: Pulmonary effort is normal. No respiratory distress.      Breath sounds: Normal breath sounds.   Musculoskeletal:         General: Tenderness (Lt calf, Lt anterior shin tenderness. ) present. Normal range of motion.      Cervical back: Normal range of motion.   Skin:     General: Skin is warm and dry.   Neurological:      General: No focal deficit present.      Mental Status: He is alert and oriented to person, place, and time.   Psychiatric:         Mood and Affect: Mood normal.         Procedures           ED Course  ED Course as of 11/20/21 2023   Sat Nov 20, 2021 1442 Patient is negative for DVT. [KG]   1447 Results are discussed with patient at this time.  Patient will be discharged home.  Patient to follow-up with his PCP.  Patient agrees and verbalized understanding. [KG]      ED Course User Index  [KG] Octavia Crisostomo, DURGA           Recent Results (from the past 24 hour(s))   Duplex Venous Lower Extremity - Left CAR    Collection Time: 11/20/21  2:40 PM   Result Value Ref Range    BSA 2.1 m^2     CV ECHO BHARGAV - BZI_BMI 27.1 kilograms/m^2     CV ECHO BHARGAV - BSA([place]) 2.2 m^2     CV ECHO BHARGAV - BZI_METRIC_WEIGHT 90.7 kg     CV ECHO BHARGAV - BZI_METRIC_HEIGHT 182.9 cm    Right Common Femoral Spont Y     Right Common Femoral Phasic Y     Right Common Femoral Augment Y     Right Common Femoral Compress C  "    Left Common Femoral Spont Y     Left Common Femoral Phasic Y     Left Common Femoral Augment Y     Left Common Femoral Compress C     Left Saphenofemoral Junction Spont Y     Left Saphenofemoral Junction Phasic Y     Left Saphenofemoral Junction Augment Y     Left Saphenofemoral Junction Compress C     Left Profunda Femoral Spont Y     Left Profunda Femoral Phasic Y     Left Profunda Femoral Augment Y     Left Profunda Femoral Compress C     Left Proximal Femoral Spont Y     Left Proximal Femoral Phasic Y     Left Proximal Femoral Augment Y     Left Proximal Femoral Compress C     Left Mid Femoral Spont Y     Left Mid Femoral Phasic Y     Left Mid Femoral Augment Y     Left Mid Femoral Compress C     Left Distal Femoral Spont Y     Left Distal Femoral Phasic Y     Left Distal Femoral Augment Y     Left Distal Femoral Compress C     Left Popliteal Spont Y     Left Popliteal Phasic Y     Left Popliteal Augment Y     Left Popliteal Compress C     Left Posterior Tibial Compress C     Left Peroneal Compress C     Left Gastronemius Compress C     Left Greater Saph AK Compress C     Left Greater Saph BK Compress C     Left Lesser Saph Compress C     Lt soleal compress C      Note: In addition to lab results from this visit, the labs listed above may include labs taken at another facility or during a different encounter within the last 24 hours. Please correlate lab times with ED admission and discharge times for further clarification of the services performed during this visit.    No orders to display     Vitals:    11/20/21 1235 11/20/21 1247 11/20/21 1448   BP: 153/100     BP Location: Left arm     Patient Position: Sitting     Pulse: 85     Resp: 18     Temp: 98.2 °F (36.8 °C)     TempSrc: Oral     SpO2:  96%    Weight: 90.7 kg (200 lb)  90.7 kg (200 lb)   Height: 182.9 cm (72\")  180 cm (70.87\")     Medications - No data to display  ECG/EMG Results (last 24 hours)     ** No results found for the last 24 hours. **    "     No orders to display                                       MDM    Final diagnoses:   Pain of left lower extremity       ED Disposition  ED Disposition     ED Disposition Condition Comment    Discharge Stable           Provider, No Known  [facility] [id number]               Medication List      No changes were made to your prescriptions during this visit.          Octavia Crisostomo, APRN  11/20/21 2023

## 2021-11-22 LAB
BACTERIA SPEC AEROBE CULT: NORMAL
BACTERIA SPEC AEROBE CULT: NORMAL

## 2021-11-24 NOTE — PAYOR COMM NOTE
"BeatrizSaturnino (21 y.o. Male)             Date of Birth Social Security Number Address Home Phone MRN    2000  [address] [place], [place] [address] [phone] [id number]    Gnosticism Marital Status             Denominational        Admission Date Admission Type Admitting Provider Attending Provider Department, Room/Bed    21 Emergency Oj Cristobal MD  [facility] 2B ICU, N225/1    Discharge Date Discharge Disposition Discharge Destination          2021 Home or Self Care              Attending Provider: (none)   Allergies: Bee Pollen, Bee Venom, Keppra [Levetiracetam]    Isolation: None   Infection: None   Code Status: Prior   Advance Care Planning Activity    Ht: 182.9 cm (72.01\")   Wt: 91.1 kg (200 lb 13.4 oz)    Admission Cmt: None   Principal Problem: Status epilepticus (HCC) [G40.901]                 Active Insurance as of 2021     Primary Coverage     Payor Plan Insurance Group Employer/Plan Group    [employer] [id number]     Payor Plan Address Payor Plan Phone Number Payor Plan Fax Number Effective Dates    [address] [phone]  2020 - None Entered    [facility] [id number]       Subscriber Name Subscriber Birth Date Member ID       RUTHIE HENDERSON 12/3/1960 [id number]                 Emergency Contacts      (Rel.) Home Phone Work Phone Mobile Phone    SRIRAMJUAN CARLOS (Spouse) -- -- [phone]    SMITA HENDERSON (Father) -- -- [phone]               Discharge Summary      Erma Wolf APRN at 21 1326     Attestation signed by Sidney Osorio MD at 21 1501    I have reviewed this documentation and agree.                    Discharge Summary    Patient name: Saturnino Henderson  CSN: [id number]  MRN: [id number]  : 2000  Today's date: 2021     Date of Admission: 2021  Date of Discharge:  2021    Admitting Physician:  " Dr. Vigil  Primary Care Provider: Provider, No Known  Consultations:   Neurology:  Dr. Greer    Admission Diagnosis:  Status Epilepticus     Discharge Diagnoses:   Active Hospital Problems    Diagnosis    • **Status epilepticus (HCC)    • TBI (traumatic brain injury) (HCC)      Age 5     • Essential hypertension      Allergies:  Bee pollen, Bee venom, and Keppra [levetiracetam]    Code Status and Medical Interventions:   Ordered at: 21 1028     Code Status (Patient has no pulse and is not breathing):    CPR (Attempt to Resuscitate)     Medical Interventions (Patient has pulse or is breathing):    Full Support     Procedures/Testin21 EEG - Study shows evidence for focal dysfunction of the right hemisphere.  No evidence for ongoing seizures are seen    History of Present Illness:  Saturnino Henderson is a 21 y.o. male, non-smoker, with a past medical history significant for traumatic brain injury, seizures, hypertension, and retinal detachment.  The patient presents to [facility] ED via EMS for seizure activity.  Information is obtained from the patient's wife as he is intubated and poorly responsive.  She reports that he had a seizure which lasted approximately 1 minute at 4 AM this morning.  He again had a seizure which lasted approximately 25 minutes at proximately 825 AM this morning at which time she called EMS.  On their arrival to the scene attempt was made to intubate the patient as his oxygen saturations were found to be 60-70%, and he was otherwise unable to protect his airway.  He continued to seize, and oxygen saturation remained low following intubation.  There was concern that intubation was unsuccessful.  On arrival to ED patient appeared to be postictal and was intubated successfully.  He did not appear to move his left side, was tumescent.  With a concern for other neurological issues the patient was taken emergently for CT scan further evaluation.  This did not show evidence of  "intracranial abnormality.  He was notably incontinent of urine with estimate of approximately 800 mL by nursing staff.  Significant labs: ABG pH 7.32/PCO2 30.2/PO2 139/bicarb 15.9/base deficit 8.8, glucose 236, sodium 127, creatinine 1.39, WBCs 14.18, BNP 77.9.     He is followed by neurology in [facility] for his seizures.  It appears that the patient has taken Topamax, and Trileptal in the past. His wife reports that he has been vaccinated for Covid-19, and that the second vaccination \"caused a seizure\" which occurred later the day of vaccination.  Prior to presentation she reports that he had been in his normal state of health, and denied recent fever, chills, chest pain or shortness of air, nausea/vomiting/constipation/diarrhea.  He has however had worsening headaches/migraines recently.  She reports that his seizure activity seems to worsen with stress, and sleep deprivation.     The patient developed agitation when he was taken for a head CT scan and was right side/thrashing about.  He was apparently left hemiparetic at that time.  Additional sedation was given.     On my evaluation, the patient's sedated and unresponsive.  He is on Versed and fentanyl drips.    Hospital Course:  He had an EEG with results above.  He was placed on antibiotics for possible meningitis.  He was successfully extubated the following day.  After extubation, the antibiotics were discontinued due to mentation and clinical exam proving meningitis to be unlikely.  He had some secretions with mildly elevated WBC with temps 100-101.  Sputum culture reveals heavy growth Staph Aureus.  CXR was unremarkable.  He was continued on Trileptal and Topamax was increased.  He has not had any more witnessed seizures.  Mentation has continued to improve.  He has been cleared to go home today with outpatient follow up with primary Neurologist.  There are plans for outpatient referral to Neurology at [facility].  He will " "be discharged home on a course of Doxycycline for positive sputum culture.    Vitals:  /99 (BP Location: Left arm, Patient Position: Lying)   Pulse 84   Temp 99.4 °F (37.4 °C) (Oral)   Resp 18   Ht 182.9 cm (72.01\")   Wt 91.1 kg (200 lb 13.4 oz)   SpO2 95%   BMI 27.23 kg/m²     Physical Exam:  Constitutional:  Appears well-developed and well-nourished. No distress.   HEENT:  Normocephalic and atraumatic. PERRL  Neck:  Neck supple. No JVD present.   CV: Normal rate, regular rhythm,  intact distal pulses.  No gallop, murmur or rub.  Pulmonary/Chest: Effort normal and breath sounds normal. No respiratory distress. No wheezes, rhonchi or rales.   Abdominal: Soft. +BS. No distension and no mass. There is no tenderness.   Musculoskeletal: Normal muscle tone and strength  Neurological: Alert and oriented to person, place, and time.    Skin: Skin is warm and dry. No rash noted.   Extremities:  No clubbing, edema or cyanosis  Psychiatric: Normal mood and affect. Behavior is normal.     Labs:  Results from last 7 days   Lab Units 11/19/21  0522   WBC 10*3/mm3 14.97*   HEMOGLOBIN g/dL 12.5*   HEMATOCRIT % 36.4*   PLATELETS 10*3/mm3 310     Results from last 7 days   Lab Units 11/19/21  0522 11/18/21  0227 11/17/21  0922   SODIUM mmol/L 141   < > 127*   POTASSIUM mmol/L 3.5   < > 3.9   CHLORIDE mmol/L 109*   < > 92*   CO2 mmol/L 17.0*   < > 13.0*   BUN mg/dL 19   < > 13   CREATININE mg/dL 1.23   < > 1.39*   CALCIUM mg/dL 9.8   < > 9.2   BILIRUBIN mg/dL  --   --  0.2   ALK PHOS U/L  --   --  143*   ALT (SGPT) U/L  --   --  13   AST (SGOT) U/L  --   --  14   GLUCOSE mg/dL 96   < > 236*    < > = values in this interval not displayed.         Magnesium   Date Value Ref Range Status   11/19/2021 2.1 1.6 - 2.6 mg/dL Final   11/18/2021 2.6 1.6 - 2.6 mg/dL Final     Phosphorus   Date Value Ref Range Status   11/19/2021 2.5 2.5 - 4.5 mg/dL Final   11/18/2021 5.3 (H) 2.5 - 4.5 mg/dL Final              Discharge Medications "      New Medications      Instructions Start Date   doxycycline 100 MG capsule  Commonly known as: VIBRAMYCIN   100 mg, Oral, 2 Times Daily         Changes to Medications      Instructions Start Date   topiramate 50 MG tablet  Commonly known as: TOPAMAX  What changed:   · how much to take  · when to take this  · additional instructions   150 mg, Oral, Every 12 Hours Scheduled   Start Date: November 20, 2021        Continue These Medications      Instructions Start Date   EPINEPHrine 0.3 MG/0.3ML solution auto-injector injection  Commonly known as: EPIPEN   0.3 mg, Intramuscular      lisinopril 10 MG tablet  Commonly known as: PRINIVIL,ZESTRIL   10 mg, Oral, Daily      LORazepam 0.5 MG tablet  Commonly known as: ATIVAN   0.5 mg, Oral, 2 Times Daily      OXcarbazepine 600 MG tablet  Commonly known as: TRILEPTAL   600 mg, Oral, Patient states that he takes 1.5 tablets in the morning, and 2 tablets at night           Diet Instructions     Diet: Regular; Thin      Discharge Diet: Regular    Fluid Consistency: Thin        Activity Instructions     Activity as Tolerated     Additional Activity Instructions:    No driving for 90 days.         Follow-up Appointments  No future appointments.  Additional Instructions for the Follow-ups that You Need to Schedule     Discharge Follow-up with PCP   As directed       Currently Documented PCP:    Provider, No Known    PCP Phone Number:    None     Follow Up Details: one week         Discharge Follow-up with Specified Provider: Neurologist at [facility]   As directed      To: Neurologist at [facility]    Follow Up Details: 2-4 weeks         Discharge Follow-up with Specified Provider: Neurologist at [facility]   As directed      To: Neurologist at [facility]    Follow Up Details: referral being made by Primary Neurologist        Additional information on Labs and Follow-ups:    Spoke with Pts father, Rodri Henderson. He stated that he was already in  contact with Pt primary Neurologist and that the office stated to contact them for F/U appointments after the pt has been discharged from the hospital.          Discharge Instructions:  Ok to go home today  Follow up as above  Scripts sent electronically     DURGA Man, AGACNP-BC, FNP-BC  Pulmonary & Critical Care Medicine.    Time: I spent  45  minutes on this discharge activity which included: face-to-face encounter with the patient, reviewing the data in the system, coordination of the care with the nursing staff as well as consultants, documentation, and entering orders.      CC: Provider, No Known        Electronically signed by Sidney Osorio MD at 11/19/21 4563

## 2021-11-24 NOTE — PAYOR COMM NOTE
"BeatrizSaturnino (21 y.o. Male)             Date of Birth Social Security Number Address Home Phone MRN    2000  [address] [place], [place] [address] [phone] [id number]    [employer] Mar[employer] Status             Ep[employer]        Admission Date Admission Type Admitting Provider Attending Provider Department, Room/Bed    21 Emergency Oj Cristobal MD  [facility] 2B ICU, N225/1    Discharge Date Discharge Disposition Discharge Destination          2021 Home or Self Care              Attending Provider: (none)   Allergies: Bee Pollen, Bee Venom, Keppra [Levetiracetam]    Isolation: None   Infection: None   Code Status: Prior   Advance Care Planning Activity    Ht: 182.9 cm (72.01\")   Wt: 91.1 kg (200 lb 13.4 oz)    Admission Cmt: None   Principal Problem: Status epilepticus (HCC) [G40.901]                 Active Insurance as of 2021     Primary Coverage     Payor Plan Insurance Group Employer/Plan Group    [employer] [id number]     Payor Plan Address Payor Plan Phone Number Payor Plan Fax Number Effective Dates    [address] [phone]  2020 - None Entered    [facility] [id number]       Subscriber Name Subscriber Birth Date Member ID       RUTHIE HENDERSON 12/3/1962 [id number]                 Emergency Contacts      (Rel.) Home Phone Work Phone Mobile Phone    SRIRAMJUAN CARLOS (Spouse) -- -- [phone]    SMITA HENDERSON (Father) -- -- [phone]               Discharge Summary      Erma Wolf APRN at 21 1326     Attestation signed by Sidney Osorio MD at 21 1501    I have reviewed this documentation and agree.                    Discharge Summary    Patient name: Saturnino Henderson  CSN: [id number]  MRN: [id number]  : 2000  Today's date: 2021     Date of Admission: 2021  Date of Discharge:  2021    Admitting Physician:  " Dr. Vigil  Primary Care Provider: Provider, No Known  Consultations:   Neurology:  Dr. Greer    Admission Diagnosis:  Status Epilepticus     Discharge Diagnoses:   Active Hospital Problems    Diagnosis    • **Status epilepticus (HCC)    • TBI (traumatic brain injury) (HCC)      Age 5     • Essential hypertension      Allergies:  Bee pollen, Bee venom, and Keppra [levetiracetam]    Code Status and Medical Interventions:   Ordered at: 21 1028     Code Status (Patient has no pulse and is not breathing):    CPR (Attempt to Resuscitate)     Medical Interventions (Patient has pulse or is breathing):    Full Support     Procedures/Testin21 EEG - Study shows evidence for focal dysfunction of the right hemisphere.  No evidence for ongoing seizures are seen    History of Present Illness:  Saturnino Henderson is a 21 y.o. male, non-smoker, with a past medical history significant for traumatic brain injury, seizures, hypertension, and retinal detachment.  The patient presents to [facility] ED via EMS for seizure activity.  Information is obtained from the patient's wife as he is intubated and poorly responsive.  She reports that he had a seizure which lasted approximately 1 minute at 4 AM this morning.  He again had a seizure which lasted approximately 25 minutes at proximately 825 AM this morning at which time she called EMS.  On their arrival to the scene attempt was made to intubate the patient as his oxygen saturations were found to be 60-70%, and he was otherwise unable to protect his airway.  He continued to seize, and oxygen saturation remained low following intubation.  There was concern that intubation was unsuccessful.  On arrival to ED patient appeared to be postictal and was intubated successfully.  He did not appear to move his left side, was tumescent.  With a concern for other neurological issues the patient was taken emergently for CT scan further evaluation.  This did not show evidence of  "intracranial abnormality.  He was notably incontinent of urine with estimate of approximately 800 mL by nursing staff.  Significant labs: ABG pH 7.32/PCO2 30.2/PO2 139/bicarb 15.9/base deficit 8.8, glucose 236, sodium 127, creatinine 1.39, WBCs 14.18, BNP 77.9.     He is followed by neurology in [place] for his seizures.  It appears that the patient has taken Topamax, and Trileptal in the past. His wife reports that he has been vaccinated for Covid-19, and that the second vaccination \"caused a seizure\" which occurred later the day of vaccination.  Prior to presentation she reports that he had been in his normal state of health, and denied recent fever, chills, chest pain or shortness of air, nausea/vomiting/constipation/diarrhea.  He has however had worsening headaches/migraines recently.  She reports that his seizure activity seems to worsen with stress, and sleep deprivation.     The patient developed agitation when he was taken for a head CT scan and was right side/thrashing about.  He was apparently left hemiparetic at that time.  Additional sedation was given.     On my evaluation, the patient's sedated and unresponsive.  He is on Versed and fentanyl drips.    Hospital Course:  He had an EEG with results above.  He was placed on antibiotics for possible meningitis.  He was successfully extubated the following day.  After extubation, the antibiotics were discontinued due to mentation and clinical exam proving meningitis to be unlikely.  He had some secretions with mildly elevated WBC with temps 100-101.  Sputum culture reveals heavy growth Staph Aureus.  CXR was unremarkable.  He was continued on Trileptal and Topamax was increased.  He has not had any more witnessed seizures.  Mentation has continued to improve.  He has been cleared to go home today with outpatient follow up with primary Neurologist.  There are plans for outpatient referral to Neurology at [facility].  He will " "be discharged home on a course of Doxycycline for positive sputum culture.    Vitals:  /99 (BP Location: Left arm, Patient Position: Lying)   Pulse 84   Temp 99.4 °F (37.4 °C) (Oral)   Resp 18   Ht 182.9 cm (72.01\")   Wt 91.1 kg (200 lb 13.4 oz)   SpO2 95%   BMI 27.23 kg/m²     Physical Exam:  Constitutional:  Appears well-developed and well-nourished. No distress.   HEENT:  Normocephalic and atraumatic. PERRL  Neck:  Neck supple. No JVD present.   CV: Normal rate, regular rhythm,  intact distal pulses.  No gallop, murmur or rub.  Pulmonary/Chest: Effort normal and breath sounds normal. No respiratory distress. No wheezes, rhonchi or rales.   Abdominal: Soft. +BS. No distension and no mass. There is no tenderness.   Musculoskeletal: Normal muscle tone and strength  Neurological: Alert and oriented to person, place, and time.    Skin: Skin is warm and dry. No rash noted.   Extremities:  No clubbing, edema or cyanosis  Psychiatric: Normal mood and affect. Behavior is normal.     Labs:  Results from last 7 days   Lab Units 11/19/21  0522   WBC 10*3/mm3 14.97*   HEMOGLOBIN g/dL 12.5*   HEMATOCRIT % 36.4*   PLATELETS 10*3/mm3 310     Results from last 7 days   Lab Units 11/19/21  0522 11/18/21  0227 11/17/21  0922   SODIUM mmol/L 141   < > 127*   POTASSIUM mmol/L 3.5   < > 3.9   CHLORIDE mmol/L 109*   < > 92*   CO2 mmol/L 17.0*   < > 13.0*   BUN mg/dL 19   < > 13   CREATININE mg/dL 1.23   < > 1.39*   CALCIUM mg/dL 9.8   < > 9.2   BILIRUBIN mg/dL  --   --  0.2   ALK PHOS U/L  --   --  143*   ALT (SGPT) U/L  --   --  13   AST (SGOT) U/L  --   --  14   GLUCOSE mg/dL 96   < > 236*    < > = values in this interval not displayed.         Magnesium   Date Value Ref Range Status   11/19/2021 2.1 1.6 - 2.6 mg/dL Final   11/18/2021 2.6 1.6 - 2.6 mg/dL Final     Phosphorus   Date Value Ref Range Status   11/19/2021 2.5 2.5 - 4.5 mg/dL Final   11/18/2021 5.3 (H) 2.5 - 4.5 mg/dL Final              Discharge Medications "      New Medications      Instructions Start Date   doxycycline 100 MG capsule  Commonly known as: VIBRAMYCIN   100 mg, Oral, 2 Times Daily         Changes to Medications      Instructions Start Date   topiramate 50 MG tablet  Commonly known as: TOPAMAX  What changed:   · how much to take  · when to take this  · additional instructions   150 mg, Oral, Every 12 Hours Scheduled   Start Date: November 20, 2021        Continue These Medications      Instructions Start Date   EPINEPHrine 0.3 MG/0.3ML solution auto-injector injection  Commonly known as: EPIPEN   0.3 mg, Intramuscular      lisinopril 10 MG tablet  Commonly known as: PRINIVIL,ZESTRIL   10 mg, Oral, Daily      LORazepam 0.5 MG tablet  Commonly known as: ATIVAN   0.5 mg, Oral, 2 Times Daily      OXcarbazepine 600 MG tablet  Commonly known as: TRILEPTAL   600 mg, Oral, Patient states that he takes 1.5 tablets in the morning, and 2 tablets at night           Diet Instructions     Diet: Regular; Thin      Discharge Diet: Regular    Fluid Consistency: Thin        Activity Instructions     Activity as Tolerated     Additional Activity Instructions:    No driving for 90 days.         Follow-up Appointments  No future appointments.  Additional Instructions for the Follow-ups that You Need to Schedule     Discharge Follow-up with PCP   As directed       Currently Documented PCP:    Provider, No Known    PCP Phone Number:    None     Follow Up Details: one week         Discharge Follow-up with Specified Provider: Neurologist at [facility]   As directed      To: Neurologist at [facility]    Follow Up Details: 2-4 weeks         Discharge Follow-up with Specified Provider: Neurologist at [facility]   As directed      To: Neurologist at [facility]    Follow Up Details: referral being made by Primary Neurologist        Additional information on Labs and Follow-ups:    Spoke with Pts father, Rodri Henderson. He stated that he was already in  contact with Pt primary Neurologist and that the office stated to contact them for F/U appointments after the pt has been discharged from the hospital.          Discharge Instructions:  Ok to go home today  Follow up as above  Scripts sent electronically     DURGA Man, AGACNP-BC, FNP-BC  Pulmonary & Critical Care Medicine.    Time: I spent  45  minutes on this discharge activity which included: face-to-face encounter with the patient, reviewing the data in the system, coordination of the care with the nursing staff as well as consultants, documentation, and entering orders.      CC: Provider, No Known        Electronically signed by Sidney Osorio MD at 11/19/21 [id number]